# Patient Record
Sex: MALE | Race: WHITE | NOT HISPANIC OR LATINO
[De-identification: names, ages, dates, MRNs, and addresses within clinical notes are randomized per-mention and may not be internally consistent; named-entity substitution may affect disease eponyms.]

---

## 2017-01-11 ENCOUNTER — APPOINTMENT (OUTPATIENT)
Dept: ENDOCRINOLOGY | Facility: CLINIC | Age: 47
End: 2017-01-11

## 2017-01-11 VITALS
HEART RATE: 109 BPM | BODY MASS INDEX: 46.29 KG/M2 | OXYGEN SATURATION: 98 % | SYSTOLIC BLOOD PRESSURE: 102 MMHG | DIASTOLIC BLOOD PRESSURE: 70 MMHG | WEIGHT: 258 LBS | HEIGHT: 62.5 IN

## 2017-01-11 LAB
GLUCOSE BLDC GLUCOMTR-MCNC: 240
HBA1C MFR BLD HPLC: 7.3

## 2017-01-25 ENCOUNTER — RX RENEWAL (OUTPATIENT)
Age: 47
End: 2017-01-25

## 2017-01-30 ENCOUNTER — APPOINTMENT (OUTPATIENT)
Dept: ENDOCRINOLOGY | Facility: CLINIC | Age: 47
End: 2017-01-30

## 2017-02-21 ENCOUNTER — MEDICATION RENEWAL (OUTPATIENT)
Age: 47
End: 2017-02-21

## 2017-02-21 RX ORDER — BLOOD-GLUCOSE METER
W/DEVICE KIT MISCELLANEOUS
Qty: 1 | Refills: 0 | Status: ACTIVE | COMMUNITY
Start: 2017-02-21 | End: 1900-01-01

## 2017-02-21 RX ORDER — LANCETS 33 GAUGE
EACH MISCELLANEOUS
Qty: 2 | Refills: 0 | Status: ACTIVE | COMMUNITY
Start: 2017-02-21 | End: 1900-01-01

## 2017-02-21 RX ORDER — BLOOD SUGAR DIAGNOSTIC
STRIP MISCELLANEOUS
Qty: 2 | Refills: 0 | Status: ACTIVE | COMMUNITY
Start: 2017-02-21 | End: 1900-01-01

## 2017-04-18 ENCOUNTER — OTHER (OUTPATIENT)
Age: 47
End: 2017-04-18

## 2017-05-09 ENCOUNTER — RX RENEWAL (OUTPATIENT)
Age: 47
End: 2017-05-09

## 2017-05-14 ENCOUNTER — RX RENEWAL (OUTPATIENT)
Age: 47
End: 2017-05-14

## 2017-05-15 ENCOUNTER — RX RENEWAL (OUTPATIENT)
Age: 47
End: 2017-05-15

## 2017-05-25 ENCOUNTER — APPOINTMENT (OUTPATIENT)
Dept: CARDIOLOGY | Facility: CLINIC | Age: 47
End: 2017-05-25

## 2017-06-06 ENCOUNTER — APPOINTMENT (OUTPATIENT)
Dept: ENDOCRINOLOGY | Facility: CLINIC | Age: 47
End: 2017-06-06

## 2017-06-06 VITALS
HEIGHT: 74.5 IN | SYSTOLIC BLOOD PRESSURE: 120 MMHG | WEIGHT: 259 LBS | BODY MASS INDEX: 32.89 KG/M2 | HEART RATE: 99 BPM | DIASTOLIC BLOOD PRESSURE: 80 MMHG | OXYGEN SATURATION: 98 %

## 2017-06-06 LAB
GLUCOSE BLDC GLUCOMTR-MCNC: 170
HBA1C MFR BLD HPLC: 8.6

## 2017-06-06 RX ORDER — ALBUTEROL SULFATE 90 UG/1
108 (90 BASE) AEROSOL, METERED RESPIRATORY (INHALATION)
Qty: 8 | Refills: 0 | Status: ACTIVE | COMMUNITY
Start: 2017-01-03

## 2017-06-13 ENCOUNTER — MEDICATION RENEWAL (OUTPATIENT)
Age: 47
End: 2017-06-13

## 2017-06-19 ENCOUNTER — RX RENEWAL (OUTPATIENT)
Age: 47
End: 2017-06-19

## 2017-10-06 ENCOUNTER — MEDICATION RENEWAL (OUTPATIENT)
Age: 47
End: 2017-10-06

## 2017-10-16 ENCOUNTER — RX RENEWAL (OUTPATIENT)
Age: 47
End: 2017-10-16

## 2017-10-31 ENCOUNTER — APPOINTMENT (OUTPATIENT)
Dept: ENDOCRINOLOGY | Facility: CLINIC | Age: 47
End: 2017-10-31
Payer: COMMERCIAL

## 2017-10-31 LAB
GLUCOSE BLDC GLUCOMTR-MCNC: 255
HBA1C MFR BLD HPLC: 10.5

## 2017-10-31 PROCEDURE — 83036 HEMOGLOBIN GLYCOSYLATED A1C: CPT | Mod: QW

## 2017-10-31 PROCEDURE — G0008: CPT

## 2017-10-31 PROCEDURE — 82962 GLUCOSE BLOOD TEST: CPT

## 2017-10-31 PROCEDURE — 99214 OFFICE O/P EST MOD 30 MIN: CPT | Mod: 25

## 2017-10-31 PROCEDURE — 90686 IIV4 VACC NO PRSV 0.5 ML IM: CPT

## 2017-11-02 LAB
ALBUMIN SERPL ELPH-MCNC: 4.6 G/DL
ALP BLD-CCNC: 67 U/L
ALT SERPL-CCNC: 35 U/L
ANION GAP SERPL CALC-SCNC: 22 MMOL/L
AST SERPL-CCNC: 33 U/L
BILIRUB SERPL-MCNC: 1.1 MG/DL
BUN SERPL-MCNC: 16 MG/DL
CALCIUM SERPL-MCNC: 10 MG/DL
CHLORIDE SERPL-SCNC: 100 MMOL/L
CHOLEST SERPL-MCNC: 149 MG/DL
CHOLEST/HDLC SERPL: 6 RATIO
CO2 SERPL-SCNC: 20 MMOL/L
CREAT SERPL-MCNC: 0.95 MG/DL
CREAT SPEC-SCNC: 93 MG/DL
GLUCOSE SERPL-MCNC: 271 MG/DL
HDLC SERPL-MCNC: 25 MG/DL
LDLC SERPL CALC-MCNC: 53 MG/DL
MICROALBUMIN 24H UR DL<=1MG/L-MCNC: 2 MG/DL
MICROALBUMIN/CREAT 24H UR-RTO: 22 MG/G
POTASSIUM SERPL-SCNC: 4.7 MMOL/L
PROT SERPL-MCNC: 7.9 G/DL
SODIUM SERPL-SCNC: 142 MMOL/L
TRIGL SERPL-MCNC: 355 MG/DL

## 2017-12-05 ENCOUNTER — APPOINTMENT (OUTPATIENT)
Dept: ENDOCRINOLOGY | Facility: CLINIC | Age: 47
End: 2017-12-05

## 2018-01-10 ENCOUNTER — RX RENEWAL (OUTPATIENT)
Age: 48
End: 2018-01-10

## 2018-01-24 ENCOUNTER — APPOINTMENT (OUTPATIENT)
Dept: UROLOGY | Facility: CLINIC | Age: 48
End: 2018-01-24
Payer: COMMERCIAL

## 2018-01-24 PROCEDURE — 99204 OFFICE O/P NEW MOD 45 MIN: CPT

## 2018-02-05 ENCOUNTER — RX RENEWAL (OUTPATIENT)
Age: 48
End: 2018-02-05

## 2018-03-02 ENCOUNTER — APPOINTMENT (OUTPATIENT)
Dept: UROLOGY | Facility: CLINIC | Age: 48
End: 2018-03-02

## 2018-04-10 ENCOUNTER — RX RENEWAL (OUTPATIENT)
Age: 48
End: 2018-04-10

## 2018-05-13 ENCOUNTER — RX RENEWAL (OUTPATIENT)
Age: 48
End: 2018-05-13

## 2018-05-29 ENCOUNTER — RX RENEWAL (OUTPATIENT)
Age: 48
End: 2018-05-29

## 2018-05-30 ENCOUNTER — RX RENEWAL (OUTPATIENT)
Age: 48
End: 2018-05-30

## 2018-06-05 ENCOUNTER — RX RENEWAL (OUTPATIENT)
Age: 48
End: 2018-06-05

## 2018-06-06 ENCOUNTER — RX RENEWAL (OUTPATIENT)
Age: 48
End: 2018-06-06

## 2018-06-21 ENCOUNTER — APPOINTMENT (OUTPATIENT)
Dept: ENDOCRINOLOGY | Facility: CLINIC | Age: 48
End: 2018-06-21

## 2018-07-27 ENCOUNTER — RX RENEWAL (OUTPATIENT)
Age: 48
End: 2018-07-27

## 2019-01-10 ENCOUNTER — RX RENEWAL (OUTPATIENT)
Age: 49
End: 2019-01-10

## 2019-01-11 ENCOUNTER — RX RENEWAL (OUTPATIENT)
Age: 49
End: 2019-01-11

## 2019-01-21 ENCOUNTER — RX RENEWAL (OUTPATIENT)
Age: 49
End: 2019-01-21

## 2019-03-12 ENCOUNTER — INPATIENT (INPATIENT)
Facility: HOSPITAL | Age: 49
LOS: 1 days | Discharge: HOME | DRG: 872 | End: 2019-03-14
Attending: UROLOGY | Admitting: UROLOGY
Payer: COMMERCIAL

## 2019-03-12 VITALS
HEIGHT: 74 IN | HEART RATE: 16 BPM | TEMPERATURE: 99 F | OXYGEN SATURATION: 100 % | WEIGHT: 250 LBS | SYSTOLIC BLOOD PRESSURE: 114 MMHG | RESPIRATION RATE: 16 BRPM | DIASTOLIC BLOOD PRESSURE: 82 MMHG

## 2019-03-12 LAB
ALBUMIN SERPL ELPH-MCNC: 4.4 G/DL — SIGNIFICANT CHANGE UP (ref 3.3–5)
ALP SERPL-CCNC: 56 U/L — SIGNIFICANT CHANGE UP (ref 40–120)
ALT FLD-CCNC: 30 U/L — SIGNIFICANT CHANGE UP (ref 10–45)
ANION GAP SERPL CALC-SCNC: 16 MMOL/L — SIGNIFICANT CHANGE UP (ref 5–17)
AST SERPL-CCNC: 57 U/L — HIGH (ref 10–40)
BASE EXCESS BLDV CALC-SCNC: 2.4 MMOL/L — HIGH (ref -2–2)
BASOPHILS # BLD AUTO: 0 K/UL — SIGNIFICANT CHANGE UP (ref 0–0.2)
BASOPHILS NFR BLD AUTO: 0.3 % — SIGNIFICANT CHANGE UP (ref 0–2)
BILIRUB SERPL-MCNC: 1 MG/DL — SIGNIFICANT CHANGE UP (ref 0.2–1.2)
BUN SERPL-MCNC: 16 MG/DL — SIGNIFICANT CHANGE UP (ref 7–23)
CA-I SERPL-SCNC: 1.14 MMOL/L — SIGNIFICANT CHANGE UP (ref 1.12–1.3)
CALCIUM SERPL-MCNC: 9.7 MG/DL — SIGNIFICANT CHANGE UP (ref 8.4–10.5)
CHLORIDE BLDV-SCNC: 105 MMOL/L — SIGNIFICANT CHANGE UP (ref 96–108)
CHLORIDE SERPL-SCNC: 100 MMOL/L — SIGNIFICANT CHANGE UP (ref 96–108)
CO2 BLDV-SCNC: 28 MMOL/L — SIGNIFICANT CHANGE UP (ref 22–30)
CO2 SERPL-SCNC: 21 MMOL/L — LOW (ref 22–31)
CREAT SERPL-MCNC: 1.05 MG/DL — SIGNIFICANT CHANGE UP (ref 0.5–1.3)
EOSINOPHIL # BLD AUTO: 0.3 K/UL — SIGNIFICANT CHANGE UP (ref 0–0.5)
EOSINOPHIL NFR BLD AUTO: 3.5 % — SIGNIFICANT CHANGE UP (ref 0–6)
GAS PNL BLDV: 135 MMOL/L — LOW (ref 136–145)
GAS PNL BLDV: SIGNIFICANT CHANGE UP
GLUCOSE BLDV-MCNC: 154 MG/DL — HIGH (ref 70–99)
GLUCOSE SERPL-MCNC: 158 MG/DL — HIGH (ref 70–99)
HCO3 BLDV-SCNC: 27 MMOL/L — SIGNIFICANT CHANGE UP (ref 21–29)
HCT VFR BLD CALC: 41.6 % — SIGNIFICANT CHANGE UP (ref 39–50)
HCT VFR BLDA CALC: 39 % — SIGNIFICANT CHANGE UP (ref 39–50)
HGB BLD CALC-MCNC: 12.7 G/DL — LOW (ref 13–17)
HGB BLD-MCNC: 15 G/DL — SIGNIFICANT CHANGE UP (ref 13–17)
LACTATE BLDV-MCNC: 1.8 MMOL/L — SIGNIFICANT CHANGE UP (ref 0.7–2)
LYMPHOCYTES # BLD AUTO: 1.8 K/UL — SIGNIFICANT CHANGE UP (ref 1–3.3)
LYMPHOCYTES # BLD AUTO: 20.9 % — SIGNIFICANT CHANGE UP (ref 13–44)
MCHC RBC-ENTMCNC: 34 PG — SIGNIFICANT CHANGE UP (ref 27–34)
MCHC RBC-ENTMCNC: 36.1 GM/DL — HIGH (ref 32–36)
MCV RBC AUTO: 94.1 FL — SIGNIFICANT CHANGE UP (ref 80–100)
MONOCYTES # BLD AUTO: 0.5 K/UL — SIGNIFICANT CHANGE UP (ref 0–0.9)
MONOCYTES NFR BLD AUTO: 5.8 % — SIGNIFICANT CHANGE UP (ref 2–14)
NEUTROPHILS # BLD AUTO: 6 K/UL — SIGNIFICANT CHANGE UP (ref 1.8–7.4)
NEUTROPHILS NFR BLD AUTO: 69.4 % — SIGNIFICANT CHANGE UP (ref 43–77)
OTHER CELLS CSF MANUAL: 11 ML/DL — LOW (ref 18–22)
PCO2 BLDV: 42 MMHG — SIGNIFICANT CHANGE UP (ref 35–50)
PH BLDV: 7.42 — SIGNIFICANT CHANGE UP (ref 7.35–7.45)
PLATELET # BLD AUTO: 110 K/UL — LOW (ref 150–400)
PO2 BLDV: 33 MMHG — SIGNIFICANT CHANGE UP (ref 25–45)
POTASSIUM BLDV-SCNC: 4.2 MMOL/L — SIGNIFICANT CHANGE UP (ref 3.5–5.3)
POTASSIUM SERPL-MCNC: 4.5 MMOL/L — SIGNIFICANT CHANGE UP (ref 3.5–5.3)
POTASSIUM SERPL-SCNC: 4.5 MMOL/L — SIGNIFICANT CHANGE UP (ref 3.5–5.3)
PROT SERPL-MCNC: 8.1 G/DL — SIGNIFICANT CHANGE UP (ref 6–8.3)
RBC # BLD: 4.42 M/UL — SIGNIFICANT CHANGE UP (ref 4.2–5.8)
RBC # FLD: 12.2 % — SIGNIFICANT CHANGE UP (ref 10.3–14.5)
SAO2 % BLDV: 61 % — LOW (ref 67–88)
SODIUM SERPL-SCNC: 137 MMOL/L — SIGNIFICANT CHANGE UP (ref 135–145)
WBC # BLD: 8.6 K/UL — SIGNIFICANT CHANGE UP (ref 3.8–10.5)
WBC # FLD AUTO: 8.6 K/UL — SIGNIFICANT CHANGE UP (ref 3.8–10.5)

## 2019-03-12 PROCEDURE — 74176 CT ABD & PELVIS W/O CONTRAST: CPT | Mod: 26

## 2019-03-12 PROCEDURE — 99285 EMERGENCY DEPT VISIT HI MDM: CPT | Mod: 25

## 2019-03-12 PROCEDURE — 93010 ELECTROCARDIOGRAM REPORT: CPT

## 2019-03-12 RX ORDER — ONDANSETRON 8 MG/1
4 TABLET, FILM COATED ORAL ONCE
Qty: 0 | Refills: 0 | Status: COMPLETED | OUTPATIENT
Start: 2019-03-12 | End: 2019-03-12

## 2019-03-12 RX ORDER — KETOROLAC TROMETHAMINE 30 MG/ML
15 SYRINGE (ML) INJECTION ONCE
Qty: 0 | Refills: 0 | Status: DISCONTINUED | OUTPATIENT
Start: 2019-03-12 | End: 2019-03-12

## 2019-03-12 RX ORDER — CEFTRIAXONE 500 MG/1
1 INJECTION, POWDER, FOR SOLUTION INTRAMUSCULAR; INTRAVENOUS ONCE
Qty: 0 | Refills: 0 | Status: COMPLETED | OUTPATIENT
Start: 2019-03-12 | End: 2019-03-12

## 2019-03-12 RX ORDER — ACETAMINOPHEN 500 MG
1000 TABLET ORAL ONCE
Qty: 0 | Refills: 0 | Status: COMPLETED | OUTPATIENT
Start: 2019-03-12 | End: 2019-03-12

## 2019-03-12 RX ORDER — SODIUM CHLORIDE 9 MG/ML
3500 INJECTION, SOLUTION INTRAVENOUS ONCE
Qty: 0 | Refills: 0 | Status: COMPLETED | OUTPATIENT
Start: 2019-03-12 | End: 2019-03-12

## 2019-03-12 RX ADMIN — Medication 15 MILLIGRAM(S): at 22:47

## 2019-03-12 RX ADMIN — SODIUM CHLORIDE 3500 MILLILITER(S): 9 INJECTION, SOLUTION INTRAVENOUS at 22:48

## 2019-03-12 RX ADMIN — ONDANSETRON 4 MILLIGRAM(S): 8 TABLET, FILM COATED ORAL at 23:14

## 2019-03-12 RX ADMIN — CEFTRIAXONE 100 GRAM(S): 500 INJECTION, POWDER, FOR SOLUTION INTRAMUSCULAR; INTRAVENOUS at 22:48

## 2019-03-12 RX ADMIN — Medication 400 MILLIGRAM(S): at 22:46

## 2019-03-12 NOTE — ED PROVIDER NOTE - PROGRESS NOTE DETAILS
Attending note (Emile): Patient now brought back from waiting room to room 34; repeat vitals upon arrival identified fever and tachycardia to low 100s; concern now for sepsis; code sepsis called, iv access established and empiric iv fluid bolus and antibiotics. Urology consulted. pt currently pain controlled. Attending note (Emile): Pt found to have 10mm stone in the R renal pelvis; given concern for sepsis on presentation, now concern for infected renal stone; allready have given ceftriaxone.  Urology consulted, to evaluate patient; awaiting their recommendations.

## 2019-03-12 NOTE — ED PROVIDER NOTE - OBJECTIVE STATEMENT
48 YOM pmh DM on insulin, kidney stones p/w right flank pain radiating to right groin x 3 days similar to previous kidney stones, no previous interventions for stones. Pt states he started having nausea and vomiting today and was unable to keep any of his pain medications down. Pt also was febrile in triage. No dysuria, no frequency. Pt denies chest pain. No weakness.

## 2019-03-12 NOTE — ED PROVIDER NOTE - CLINICAL SUMMARY MEDICAL DECISION MAKING FREE TEXT BOX
Fever, nausea, vomiting with Right flank pain radiating to Right groin with mild cvat. Concern for septic stone. Will treat with antibiotics, pain control, hydrate, consult urology. Likely source of fever is from kidney stone. Given comorbidities will likely need admission. Will also workup for DKA given abd pain and nausea and vomiting and decreased po intake.

## 2019-03-12 NOTE — ED ADULT NURSE NOTE - OBJECTIVE STATEMENT
49 yo M pmh of renal calculi and DM1 came to ED c/o rt flank pain radiation RLQ and rt groin starting today.  Pt states it feels like renal calculi.  Denies CP, SOB, diarrhea, lightheadedness, dizziness, changes in urinary or bowel habits.  A&Ox4, +CVA tenderness on right side.  able to ambulate without difficulty.  VSS.  Safety and comfort maintained.  Will continue to monitor.

## 2019-03-12 NOTE — ED PROVIDER NOTE - NS ED ROS FT
CONSTITUTIONAL: + fevers, no chills  Eyes: no visual changes  Ears: no ear drainage, no ear pain  Nose: no nasal congestion  Mouth/Throat: no sore throat  Cardiovascular: No Chest pain  Respiratory: No SOB  Gastrointestinal: +n/v, + abd pain  Genitourinary: no dysuria, no hematuria  SKIN: no rashes.  NEURO: no headache

## 2019-03-12 NOTE — ED PROVIDER NOTE - SHIFT CHANGE DETAILS
I have endorsed this patient to the incoming physician, including clinical history, physical exam, current results and assessment/plan. Pending Urology consultation and admission. -Emile

## 2019-03-12 NOTE — ED PROVIDER NOTE - ATTENDING CONTRIBUTION TO CARE
49y/o M with h/o DM-1 and renal stones, presenting with R flank pain radiating to groin; found to be febrile, tachycardic; sepsis suspected (likely urinary source based on presentation); started on empiric antibiotics and given 30cc / kg iv fluid bolus, labs including blood and urine cultures sent and CT AP ordered (renal stone hunt protocol).    On exam, appears in NAD, speaking in clear sentences, A&Ox3. Febrile.  Cardiac s1s2, Lungs CTABL, Abd soft; +R CVA tenderness; no significant abdominal tenderness, no rebound/guarding, no overlying skin changes;  exam shows normal nontender testes, no penile discharge, no palpable inguinal hernias.    Concern for urosepsis, possibly c/b renal stone; less likely other intraabdominal pathology (no RLQ tenderness to suggest appendicitis).      ED Course:  CT obtained, revealed 10mm stone in the R renal pelvis appearing to obstruct; and R perinephric stranding; concerning for pyelonephritis c/b stone; have consulted urology and will plan for admission for further management.

## 2019-03-12 NOTE — ED ADULT NURSE NOTE - NSIMPLEMENTINTERV_GEN_ALL_ED
Implemented All Universal Safety Interventions:  Helm to call system. Call bell, personal items and telephone within reach. Instruct patient to call for assistance. Room bathroom lighting operational. Non-slip footwear when patient is off stretcher. Physically safe environment: no spills, clutter or unnecessary equipment. Stretcher in lowest position, wheels locked, appropriate side rails in place.

## 2019-03-13 DIAGNOSIS — A41.9 SEPSIS, UNSPECIFIED ORGANISM: ICD-10-CM

## 2019-03-13 LAB
ANION GAP SERPL CALC-SCNC: 11 MMOL/L — SIGNIFICANT CHANGE UP (ref 5–17)
APPEARANCE UR: CLEAR — SIGNIFICANT CHANGE UP
APTT BLD: 28.1 SEC — SIGNIFICANT CHANGE UP (ref 27.5–36.3)
BILIRUB UR-MCNC: NEGATIVE — SIGNIFICANT CHANGE UP
BUN SERPL-MCNC: 13 MG/DL — SIGNIFICANT CHANGE UP (ref 7–23)
CALCIUM SERPL-MCNC: 9.3 MG/DL — SIGNIFICANT CHANGE UP (ref 8.4–10.5)
CHLORIDE SERPL-SCNC: 101 MMOL/L — SIGNIFICANT CHANGE UP (ref 96–108)
CO2 SERPL-SCNC: 27 MMOL/L — SIGNIFICANT CHANGE UP (ref 22–31)
COLOR SPEC: YELLOW — SIGNIFICANT CHANGE UP
CREAT SERPL-MCNC: 1.06 MG/DL — SIGNIFICANT CHANGE UP (ref 0.5–1.3)
DIFF PNL FLD: NEGATIVE — SIGNIFICANT CHANGE UP
GLUCOSE BLDC GLUCOMTR-MCNC: 113 MG/DL — HIGH (ref 70–99)
GLUCOSE BLDC GLUCOMTR-MCNC: 118 MG/DL — HIGH (ref 70–99)
GLUCOSE BLDC GLUCOMTR-MCNC: 133 MG/DL — HIGH (ref 70–99)
GLUCOSE BLDC GLUCOMTR-MCNC: 135 MG/DL — HIGH (ref 70–99)
GLUCOSE SERPL-MCNC: 149 MG/DL — HIGH (ref 70–99)
GLUCOSE UR QL: ABNORMAL
HBA1C BLD-MCNC: 5.3 % — SIGNIFICANT CHANGE UP (ref 4–5.6)
HCT VFR BLD CALC: 36.4 % — LOW (ref 39–50)
HGB BLD-MCNC: 13.5 G/DL — SIGNIFICANT CHANGE UP (ref 13–17)
INR BLD: 1.28 RATIO — HIGH (ref 0.88–1.16)
KETONES UR-MCNC: ABNORMAL
LEUKOCYTE ESTERASE UR-ACNC: NEGATIVE — SIGNIFICANT CHANGE UP
MCHC RBC-ENTMCNC: 35.1 PG — HIGH (ref 27–34)
MCHC RBC-ENTMCNC: 37 GM/DL — HIGH (ref 32–36)
MCV RBC AUTO: 94.7 FL — SIGNIFICANT CHANGE UP (ref 80–100)
NITRITE UR-MCNC: NEGATIVE — SIGNIFICANT CHANGE UP
PH UR: 6.5 — SIGNIFICANT CHANGE UP (ref 5–8)
PLATELET # BLD AUTO: 98 K/UL — LOW (ref 150–400)
POTASSIUM SERPL-MCNC: 4.3 MMOL/L — SIGNIFICANT CHANGE UP (ref 3.5–5.3)
POTASSIUM SERPL-SCNC: 4.3 MMOL/L — SIGNIFICANT CHANGE UP (ref 3.5–5.3)
PROT UR-MCNC: ABNORMAL
PROTHROM AB SERPL-ACNC: 14.8 SEC — HIGH (ref 10–12.9)
RBC # BLD: 3.84 M/UL — LOW (ref 4.2–5.8)
RBC # FLD: 12.1 % — SIGNIFICANT CHANGE UP (ref 10.3–14.5)
SODIUM SERPL-SCNC: 139 MMOL/L — SIGNIFICANT CHANGE UP (ref 135–145)
SP GR SPEC: 1.03 — HIGH (ref 1.01–1.02)
UROBILINOGEN FLD QL: ABNORMAL
WBC # BLD: 7.7 K/UL — SIGNIFICANT CHANGE UP (ref 3.8–10.5)
WBC # FLD AUTO: 7.7 K/UL — SIGNIFICANT CHANGE UP (ref 3.8–10.5)

## 2019-03-13 PROCEDURE — 99252 IP/OBS CONSLTJ NEW/EST SF 35: CPT

## 2019-03-13 RX ORDER — OXYCODONE AND ACETAMINOPHEN 5; 325 MG/1; MG/1
2 TABLET ORAL EVERY 6 HOURS
Qty: 0 | Refills: 0 | Status: DISCONTINUED | OUTPATIENT
Start: 2019-03-13 | End: 2019-03-14

## 2019-03-13 RX ORDER — INSULIN LISPRO 100/ML
15 VIAL (ML) SUBCUTANEOUS
Qty: 0 | Refills: 0 | Status: DISCONTINUED | OUTPATIENT
Start: 2019-03-13 | End: 2019-03-14

## 2019-03-13 RX ORDER — HYDROMORPHONE HYDROCHLORIDE 2 MG/ML
1 INJECTION INTRAMUSCULAR; INTRAVENOUS; SUBCUTANEOUS EVERY 4 HOURS
Qty: 0 | Refills: 0 | Status: DISCONTINUED | OUTPATIENT
Start: 2019-03-13 | End: 2019-03-14

## 2019-03-13 RX ORDER — ASPIRIN/CALCIUM CARB/MAGNESIUM 324 MG
81 TABLET ORAL DAILY
Qty: 0 | Refills: 0 | Status: DISCONTINUED | OUTPATIENT
Start: 2019-03-13 | End: 2019-03-14

## 2019-03-13 RX ORDER — SODIUM CHLORIDE 9 MG/ML
1000 INJECTION, SOLUTION INTRAVENOUS
Qty: 0 | Refills: 0 | Status: DISCONTINUED | OUTPATIENT
Start: 2019-03-13 | End: 2019-03-14

## 2019-03-13 RX ORDER — DEXTROSE 50 % IN WATER 50 %
12.5 SYRINGE (ML) INTRAVENOUS ONCE
Qty: 0 | Refills: 0 | Status: DISCONTINUED | OUTPATIENT
Start: 2019-03-13 | End: 2019-03-14

## 2019-03-13 RX ORDER — ATORVASTATIN CALCIUM 80 MG/1
20 TABLET, FILM COATED ORAL AT BEDTIME
Qty: 0 | Refills: 0 | Status: DISCONTINUED | OUTPATIENT
Start: 2019-03-13 | End: 2019-03-14

## 2019-03-13 RX ORDER — GLUCAGON INJECTION, SOLUTION 0.5 MG/.1ML
1 INJECTION, SOLUTION SUBCUTANEOUS ONCE
Qty: 0 | Refills: 0 | Status: DISCONTINUED | OUTPATIENT
Start: 2019-03-13 | End: 2019-03-14

## 2019-03-13 RX ORDER — INSULIN GLARGINE 100 [IU]/ML
30 INJECTION, SOLUTION SUBCUTANEOUS AT BEDTIME
Qty: 0 | Refills: 0 | Status: DISCONTINUED | OUTPATIENT
Start: 2019-03-13 | End: 2019-03-14

## 2019-03-13 RX ORDER — ONDANSETRON 8 MG/1
4 TABLET, FILM COATED ORAL EVERY 6 HOURS
Qty: 0 | Refills: 0 | Status: DISCONTINUED | OUTPATIENT
Start: 2019-03-13 | End: 2019-03-14

## 2019-03-13 RX ORDER — DEXTROSE 50 % IN WATER 50 %
15 SYRINGE (ML) INTRAVENOUS ONCE
Qty: 0 | Refills: 0 | Status: DISCONTINUED | OUTPATIENT
Start: 2019-03-13 | End: 2019-03-14

## 2019-03-13 RX ORDER — DEXTROSE 50 % IN WATER 50 %
25 SYRINGE (ML) INTRAVENOUS ONCE
Qty: 0 | Refills: 0 | Status: DISCONTINUED | OUTPATIENT
Start: 2019-03-13 | End: 2019-03-14

## 2019-03-13 RX ORDER — CEFTRIAXONE 500 MG/1
1 INJECTION, POWDER, FOR SOLUTION INTRAMUSCULAR; INTRAVENOUS EVERY 24 HOURS
Qty: 0 | Refills: 0 | Status: DISCONTINUED | OUTPATIENT
Start: 2019-03-13 | End: 2019-03-14

## 2019-03-13 RX ORDER — METFORMIN HYDROCHLORIDE 850 MG/1
1000 TABLET ORAL
Qty: 0 | Refills: 0 | Status: DISCONTINUED | OUTPATIENT
Start: 2019-03-13 | End: 2019-03-13

## 2019-03-13 RX ORDER — SENNA PLUS 8.6 MG/1
2 TABLET ORAL AT BEDTIME
Qty: 0 | Refills: 0 | Status: DISCONTINUED | OUTPATIENT
Start: 2019-03-13 | End: 2019-03-14

## 2019-03-13 RX ORDER — KETOROLAC TROMETHAMINE 30 MG/ML
15 SYRINGE (ML) INJECTION ONCE
Qty: 0 | Refills: 0 | Status: DISCONTINUED | OUTPATIENT
Start: 2019-03-13 | End: 2019-03-13

## 2019-03-13 RX ORDER — INSULIN GLARGINE 100 [IU]/ML
60 INJECTION, SOLUTION SUBCUTANEOUS AT BEDTIME
Qty: 0 | Refills: 0 | Status: DISCONTINUED | OUTPATIENT
Start: 2019-03-13 | End: 2019-03-13

## 2019-03-13 RX ORDER — ACETAMINOPHEN 500 MG
650 TABLET ORAL EVERY 6 HOURS
Qty: 0 | Refills: 0 | Status: DISCONTINUED | OUTPATIENT
Start: 2019-03-13 | End: 2019-03-14

## 2019-03-13 RX ORDER — HEPARIN SODIUM 5000 [USP'U]/ML
5000 INJECTION INTRAVENOUS; SUBCUTANEOUS EVERY 12 HOURS
Qty: 0 | Refills: 0 | Status: DISCONTINUED | OUTPATIENT
Start: 2019-03-13 | End: 2019-03-14

## 2019-03-13 RX ORDER — LOSARTAN POTASSIUM 100 MG/1
50 TABLET, FILM COATED ORAL DAILY
Qty: 0 | Refills: 0 | Status: DISCONTINUED | OUTPATIENT
Start: 2019-03-13 | End: 2019-03-14

## 2019-03-13 RX ADMIN — INSULIN GLARGINE 30 UNIT(S): 100 INJECTION, SOLUTION SUBCUTANEOUS at 22:19

## 2019-03-13 RX ADMIN — Medication 15 MILLIGRAM(S): at 09:55

## 2019-03-13 RX ADMIN — HEPARIN SODIUM 5000 UNIT(S): 5000 INJECTION INTRAVENOUS; SUBCUTANEOUS at 06:06

## 2019-03-13 RX ADMIN — Medication 81 MILLIGRAM(S): at 11:35

## 2019-03-13 RX ADMIN — HEPARIN SODIUM 5000 UNIT(S): 5000 INJECTION INTRAVENOUS; SUBCUTANEOUS at 17:55

## 2019-03-13 RX ADMIN — Medication 15 UNIT(S): at 18:56

## 2019-03-13 RX ADMIN — LOSARTAN POTASSIUM 50 MILLIGRAM(S): 100 TABLET, FILM COATED ORAL at 11:35

## 2019-03-13 RX ADMIN — ATORVASTATIN CALCIUM 20 MILLIGRAM(S): 80 TABLET, FILM COATED ORAL at 22:19

## 2019-03-13 RX ADMIN — CEFTRIAXONE 100 GRAM(S): 500 INJECTION, POWDER, FOR SOLUTION INTRAMUSCULAR; INTRAVENOUS at 22:20

## 2019-03-13 RX ADMIN — Medication 15 MILLIGRAM(S): at 10:15

## 2019-03-13 RX ADMIN — OXYCODONE AND ACETAMINOPHEN 2 TABLET(S): 5; 325 TABLET ORAL at 02:15

## 2019-03-13 RX ADMIN — OXYCODONE AND ACETAMINOPHEN 2 TABLET(S): 5; 325 TABLET ORAL at 13:47

## 2019-03-13 NOTE — H&P ADULT - HISTORY OF PRESENT ILLNESS
pt 49y/o M. PMHx  T1DM, multiple times Kidney stone w/o intervention. come to ED c/o RLQ pain x -3day duration. The pain was sharp, 10/10, intermittent, and radiates to the R flank and R groin. Had  nausea and vomited 3 times today. Denies fever and chills in the past 3days, but F: 101 at ED. Denies hematuria, dysuria nor any other urinary symptoms. Patient had CT abdominal found to have a 10mm stone in the R kidney ,  Followed up with urologist Dr. Hoenig . Multiple similar pain attacked before.

## 2019-03-13 NOTE — CONSULT NOTE ADULT - ASSESSMENT
pt 47y/o M. PMHx  T1DM, multiple times Kidney stone w/o intervention. come to ED c/o RLQ pain x -3day duration. T:101 at ED, CT show 10mm stone in R renal pelvis. Normal WBC, Cr level    Plan  - admitted to urology  - cont. Abx  - NPO over night/IVF   - DVT ppx  - pain control  - monitor VS  - strain urine  - Am lab   - reevaluation at AM possible OR

## 2019-03-13 NOTE — H&P ADULT - NSHPPHYSICALEXAM_GEN_ALL_CORE
Gen: NAD  Pulm: No respiratory distress, no subcostal retractions  CV: RRR, no JVD  Abd: Soft, mild tenderness on the RLQ, ND  Back: No CVAT B/L  : Circumcised, no lesions.  No discharge or blood at urethral meatus.  Testes descended bilaterally.  Testes and epididymis nontender bilaterally.    MSK: No edema present

## 2019-03-13 NOTE — CONSULT NOTE ADULT - SUBJECTIVE AND OBJECTIVE BOX
pt 47y/o M. PMHx  T1DM, multiple times Kidney stone w/o intervention. come to ED c/o RLQ pain x -3day duration. The pain was sharp, 10/10, intermittent, and radiates to the R flank and R groin. Had  nausea and vomited 3 times today. Denies fever and chills in the past 3days, but F: 101 at ED. Denies hematuria, dysuria nor any other urinary symptoms. Patient had CT abdominal found to have a 10mm stone in the R kidney w/ w/o hydronephrosis,  Followed up with urologist   . Multiple similar pain attacked before.       PAST MEDICAL & SURGICAL HISTORY:  Diabetes  No significant past surgical history    FAMILY HISTORY:    SOCIAL HISTORY:   Tobacco hx:  MEDICATIONS  (STANDING):    MEDICATIONS  (PRN):    Allergies    No Known Allergies    Intolerances        REVIEW OF SYSTEMS: Pertinent positives and negatives as stated in HPI, otherwise negative    Vital signs  T(C): 38.3 (03-12-19 @ 22:14), Max: 38.3 (03-12-19 @ 22:14)  HR: 102 (03-12-19 @ 22:14)  BP: 117/76 (03-12-19 @ 22:14)  SpO2: 100% (03-12-19 @ 22:14)  Wt(kg): --    Output      Physical Exam  Gen: NAD  Pulm: No respiratory distress, no subcostal retractions  CV: RRR, no JVD  Abd: Soft, mild tenderness on the RLQ, ND  Back: No CVAT B/L  : Circumcised, no lesions.  No discharge or blood at urethral meatus.  Testes descended bilaterally.  Testes and epididymis nontender bilaterally.    MSK: No edema present    LABS:        03-12 @ 22:46    WBC 8.6   / Hct 41.6  / SCr 1.05     03-12    137  |  100  |  16  ----------------------------<  158<H>  4.5   |  21<L>  |  1.05    Ca    9.7      12 Mar 2019 22:46    TPro  8.1  /  Alb  4.4  /  TBili  1.0  /  DBili  x   /  AST  57<H>  /  ALT  30  /  AlkPhos  56  03-12          Urine Cx:   Blood Cx:    Radiology:  < from: CT Abdomen and Pelvis No Cont (03.12.19 @ 22:52) >  KIDNEYS/URETERS: 10 mm calculus within the right renal pelvis. Mild   distention of a upper pole calyx.. Nonobstructing left upper pole renal   calculus. No left-sided hydronephrosis. Mild bilateral relatively   symmetric perinephric stranding, nonspecific.    BLADDER: Within normal limits.    < end of copied text > pt 47y/o M. PMHx  T1DM, multiple times Kidney stone w/o intervention. come to ED c/o RLQ pain x -3day duration. The pain was sharp, 10/10, intermittent, and radiates to the R flank and R groin. Had  nausea and vomited 3 times today. Denies fever and chills in the past 3days, but F: 101 at ED. Denies hematuria, dysuria nor any other urinary symptoms. Patient had CT abdominal found to have a 10mm stone in the R kidney ,  Followed up with urologist Dr. Hoenig . Multiple similar pain attacked before.       PAST MEDICAL & SURGICAL HISTORY:  Diabetes  No significant past surgical history    FAMILY HISTORY:    SOCIAL HISTORY:   Tobacco hx:  MEDICATIONS  (STANDING):    MEDICATIONS  (PRN):    Allergies    No Known Allergies    Intolerances        REVIEW OF SYSTEMS: Pertinent positives and negatives as stated in HPI, otherwise negative    Vital signs  T(C): 38.3 (03-12-19 @ 22:14), Max: 38.3 (03-12-19 @ 22:14)  HR: 102 (03-12-19 @ 22:14)  BP: 117/76 (03-12-19 @ 22:14)  SpO2: 100% (03-12-19 @ 22:14)  Wt(kg): --    Output      Physical Exam  Gen: NAD  Pulm: No respiratory distress, no subcostal retractions  CV: RRR, no JVD  Abd: Soft, mild tenderness on the RLQ, ND  Back: No CVAT B/L  : Circumcised, no lesions.  No discharge or blood at urethral meatus.  Testes descended bilaterally.  Testes and epididymis nontender bilaterally.    MSK: No edema present    LABS:        03-12 @ 22:46    WBC 8.6   / Hct 41.6  / SCr 1.05     03-12    137  |  100  |  16  ----------------------------<  158<H>  4.5   |  21<L>  |  1.05    Ca    9.7      12 Mar 2019 22:46    TPro  8.1  /  Alb  4.4  /  TBili  1.0  /  DBili  x   /  AST  57<H>  /  ALT  30  /  AlkPhos  56  03-12          Urine Cx:   Blood Cx:    Radiology:  < from: CT Abdomen and Pelvis No Cont (03.12.19 @ 22:52) >  KIDNEYS/URETERS: 10 mm calculus within the right renal pelvis. Mild   distention of a upper pole calyx.. Nonobstructing left upper pole renal   calculus. No left-sided hydronephrosis. Mild bilateral relatively   symmetric perinephric stranding, nonspecific.    BLADDER: Within normal limits.    < end of copied text >

## 2019-03-14 ENCOUNTER — TRANSCRIPTION ENCOUNTER (OUTPATIENT)
Age: 49
End: 2019-03-14

## 2019-03-14 VITALS
HEART RATE: 85 BPM | OXYGEN SATURATION: 98 % | RESPIRATION RATE: 18 BRPM | DIASTOLIC BLOOD PRESSURE: 72 MMHG | SYSTOLIC BLOOD PRESSURE: 120 MMHG | TEMPERATURE: 98 F

## 2019-03-14 LAB
ANION GAP SERPL CALC-SCNC: 11 MMOL/L — SIGNIFICANT CHANGE UP (ref 5–17)
BASOPHILS # BLD AUTO: 0 K/UL — SIGNIFICANT CHANGE UP (ref 0–0.2)
BASOPHILS NFR BLD AUTO: 0.7 % — SIGNIFICANT CHANGE UP (ref 0–2)
BUN SERPL-MCNC: 10 MG/DL — SIGNIFICANT CHANGE UP (ref 7–23)
CALCIUM SERPL-MCNC: 9.3 MG/DL — SIGNIFICANT CHANGE UP (ref 8.4–10.5)
CHLORIDE SERPL-SCNC: 104 MMOL/L — SIGNIFICANT CHANGE UP (ref 96–108)
CO2 SERPL-SCNC: 28 MMOL/L — SIGNIFICANT CHANGE UP (ref 22–31)
CREAT SERPL-MCNC: 1.04 MG/DL — SIGNIFICANT CHANGE UP (ref 0.5–1.3)
CULTURE RESULTS: NO GROWTH — SIGNIFICANT CHANGE UP
EOSINOPHIL # BLD AUTO: 0.3 K/UL — SIGNIFICANT CHANGE UP (ref 0–0.5)
EOSINOPHIL NFR BLD AUTO: 6.1 % — HIGH (ref 0–6)
GLUCOSE BLDC GLUCOMTR-MCNC: 148 MG/DL — HIGH (ref 70–99)
GLUCOSE BLDC GLUCOMTR-MCNC: 155 MG/DL — HIGH (ref 70–99)
GLUCOSE SERPL-MCNC: 148 MG/DL — HIGH (ref 70–99)
HCT VFR BLD CALC: 35 % — LOW (ref 39–50)
HGB BLD-MCNC: 13 G/DL — SIGNIFICANT CHANGE UP (ref 13–17)
LYMPHOCYTES # BLD AUTO: 2.1 K/UL — SIGNIFICANT CHANGE UP (ref 1–3.3)
LYMPHOCYTES # BLD AUTO: 38.2 % — SIGNIFICANT CHANGE UP (ref 13–44)
MCHC RBC-ENTMCNC: 35.3 PG — HIGH (ref 27–34)
MCHC RBC-ENTMCNC: 37 GM/DL — HIGH (ref 32–36)
MCV RBC AUTO: 95.3 FL — SIGNIFICANT CHANGE UP (ref 80–100)
MONOCYTES # BLD AUTO: 0.3 K/UL — SIGNIFICANT CHANGE UP (ref 0–0.9)
MONOCYTES NFR BLD AUTO: 5.9 % — SIGNIFICANT CHANGE UP (ref 2–14)
NEUTROPHILS # BLD AUTO: 2.6 K/UL — SIGNIFICANT CHANGE UP (ref 1.8–7.4)
NEUTROPHILS NFR BLD AUTO: 49.1 % — SIGNIFICANT CHANGE UP (ref 43–77)
PLATELET # BLD AUTO: 106 K/UL — LOW (ref 150–400)
POTASSIUM SERPL-MCNC: 4.3 MMOL/L — SIGNIFICANT CHANGE UP (ref 3.5–5.3)
POTASSIUM SERPL-SCNC: 4.3 MMOL/L — SIGNIFICANT CHANGE UP (ref 3.5–5.3)
RBC # BLD: 3.68 M/UL — LOW (ref 4.2–5.8)
RBC # FLD: 12.3 % — SIGNIFICANT CHANGE UP (ref 10.3–14.5)
SODIUM SERPL-SCNC: 143 MMOL/L — SIGNIFICANT CHANGE UP (ref 135–145)
SPECIMEN SOURCE: SIGNIFICANT CHANGE UP
WBC # BLD: 5.4 K/UL — SIGNIFICANT CHANGE UP (ref 3.8–10.5)
WBC # FLD AUTO: 5.4 K/UL — SIGNIFICANT CHANGE UP (ref 3.8–10.5)

## 2019-03-14 PROCEDURE — 93005 ELECTROCARDIOGRAM TRACING: CPT

## 2019-03-14 PROCEDURE — 84132 ASSAY OF SERUM POTASSIUM: CPT

## 2019-03-14 PROCEDURE — 74176 CT ABD & PELVIS W/O CONTRAST: CPT

## 2019-03-14 PROCEDURE — 82330 ASSAY OF CALCIUM: CPT

## 2019-03-14 PROCEDURE — 85027 COMPLETE CBC AUTOMATED: CPT

## 2019-03-14 PROCEDURE — 87086 URINE CULTURE/COLONY COUNT: CPT

## 2019-03-14 PROCEDURE — 84295 ASSAY OF SERUM SODIUM: CPT

## 2019-03-14 PROCEDURE — 83605 ASSAY OF LACTIC ACID: CPT

## 2019-03-14 PROCEDURE — 96375 TX/PRO/DX INJ NEW DRUG ADDON: CPT

## 2019-03-14 PROCEDURE — 87040 BLOOD CULTURE FOR BACTERIA: CPT

## 2019-03-14 PROCEDURE — 82962 GLUCOSE BLOOD TEST: CPT

## 2019-03-14 PROCEDURE — 99231 SBSQ HOSP IP/OBS SF/LOW 25: CPT

## 2019-03-14 PROCEDURE — 85610 PROTHROMBIN TIME: CPT

## 2019-03-14 PROCEDURE — 83036 HEMOGLOBIN GLYCOSYLATED A1C: CPT

## 2019-03-14 PROCEDURE — 80048 BASIC METABOLIC PNL TOTAL CA: CPT

## 2019-03-14 PROCEDURE — 99285 EMERGENCY DEPT VISIT HI MDM: CPT | Mod: 25

## 2019-03-14 PROCEDURE — 82435 ASSAY OF BLOOD CHLORIDE: CPT

## 2019-03-14 PROCEDURE — 82803 BLOOD GASES ANY COMBINATION: CPT

## 2019-03-14 PROCEDURE — 85730 THROMBOPLASTIN TIME PARTIAL: CPT

## 2019-03-14 PROCEDURE — 81001 URINALYSIS AUTO W/SCOPE: CPT

## 2019-03-14 PROCEDURE — 85014 HEMATOCRIT: CPT

## 2019-03-14 PROCEDURE — 96374 THER/PROPH/DIAG INJ IV PUSH: CPT

## 2019-03-14 PROCEDURE — 80053 COMPREHEN METABOLIC PANEL: CPT

## 2019-03-14 PROCEDURE — 82565 ASSAY OF CREATININE: CPT

## 2019-03-14 PROCEDURE — G0378: CPT

## 2019-03-14 PROCEDURE — 82947 ASSAY GLUCOSE BLOOD QUANT: CPT

## 2019-03-14 RX ORDER — SENNA PLUS 8.6 MG/1
2 TABLET ORAL
Qty: 0 | Refills: 0 | COMMUNITY
Start: 2019-03-14

## 2019-03-14 RX ORDER — CEFDINIR 250 MG/5ML
1 POWDER, FOR SUSPENSION ORAL
Qty: 14 | Refills: 0 | OUTPATIENT
Start: 2019-03-14 | End: 2019-03-20

## 2019-03-14 RX ORDER — INSULIN LISPRO 100/ML
VIAL (ML) SUBCUTANEOUS
Qty: 0 | Refills: 0 | Status: DISCONTINUED | OUTPATIENT
Start: 2019-03-14 | End: 2019-03-14

## 2019-03-14 RX ORDER — INSULIN LISPRO 100/ML
VIAL (ML) SUBCUTANEOUS AT BEDTIME
Qty: 0 | Refills: 0 | Status: DISCONTINUED | OUTPATIENT
Start: 2019-03-14 | End: 2019-03-14

## 2019-03-14 RX ADMIN — HEPARIN SODIUM 5000 UNIT(S): 5000 INJECTION INTRAVENOUS; SUBCUTANEOUS at 05:19

## 2019-03-14 RX ADMIN — SODIUM CHLORIDE 125 MILLILITER(S): 9 INJECTION, SOLUTION INTRAVENOUS at 08:04

## 2019-03-14 RX ADMIN — Medication 15 UNIT(S): at 07:59

## 2019-03-14 RX ADMIN — Medication 15 UNIT(S): at 13:10

## 2019-03-14 RX ADMIN — Medication 81 MILLIGRAM(S): at 13:12

## 2019-03-14 RX ADMIN — LOSARTAN POTASSIUM 50 MILLIGRAM(S): 100 TABLET, FILM COATED ORAL at 05:19

## 2019-03-14 NOTE — DISCHARGE NOTE PROVIDER - HOSPITAL COURSE
49 yo male who arrived at [facility] on 3/12/19 with right renal colic and found with 1 cm intrarenal stone & fever. He was admitted and observed on antibiotics. Labs and vitals were stable. Was discharged on empiric abx as cx were negative

## 2019-03-14 NOTE — DISCHARGE NOTE PROVIDER - NSDCCPCAREPLAN_GEN_ALL_CORE_FT
PRINCIPAL DISCHARGE DIAGNOSIS  Diagnosis: Sepsis  Assessment and Plan of Treatment: remain infection free  continue antibiotics      SECONDARY DISCHARGE DIAGNOSES  Diagnosis: Kidney stone  Assessment and Plan of Treatment: Call the office if you experience fever, chills, uncontrolled pain, the inability to tolerate liquids, or the urine does not flow  Follow up with DR. Pierce or one of the urologist within one to two weeks of discharge. [phone]

## 2019-03-14 NOTE — DISCHARGE NOTE PROVIDER - CARE PROVIDER_API CALL
Jose Pierce (MD)  Urology  [address], [address] [place] [address]  Phone: [phone]  Fax: [phone]  Follow Up Time:

## 2019-03-14 NOTE — DISCHARGE NOTE NURSING/CASE MANAGEMENT/SOCIAL WORK - NSDCPNDISPN_GEN_ALL_CORE
Education provided on the pain management plan of care/Side effects of pain management treatment/Safe use, storage and disposal of opioids when prescribed/Activities of daily living, including home environment that might     exacerbate pain or reduce effectiveness of the pain management plan of care as well as strategies to address these issues/Opioids not applicable/not prescribed

## 2019-03-14 NOTE — DISCHARGE NOTE NURSING/CASE MANAGEMENT/SOCIAL WORK - NSDCDPATPORTLINK_GEN_ALL_CORE
You can access the [facility] Patient Portal, offered by [facility], by registering with the following website: [email or web address]

## 2019-03-18 LAB
CULTURE RESULTS: SIGNIFICANT CHANGE UP
CULTURE RESULTS: SIGNIFICANT CHANGE UP
SPECIMEN SOURCE: SIGNIFICANT CHANGE UP
SPECIMEN SOURCE: SIGNIFICANT CHANGE UP

## 2019-03-19 ENCOUNTER — APPOINTMENT (OUTPATIENT)
Dept: UROLOGY | Facility: CLINIC | Age: 49
End: 2019-03-19
Payer: COMMERCIAL

## 2019-03-19 VITALS
RESPIRATION RATE: 18 BRPM | BODY MASS INDEX: 31.49 KG/M2 | HEART RATE: 97 BPM | SYSTOLIC BLOOD PRESSURE: 112 MMHG | DIASTOLIC BLOOD PRESSURE: 71 MMHG | HEIGHT: 74.5 IN | WEIGHT: 248 LBS

## 2019-03-19 PROCEDURE — 99215 OFFICE O/P EST HI 40 MIN: CPT

## 2019-03-19 RX ORDER — FLASH GLUCOSE SENSOR
KIT MISCELLANEOUS
Qty: 3 | Refills: 0 | Status: ACTIVE | COMMUNITY
Start: 2018-07-23

## 2019-03-19 RX ORDER — GLUCAGON 1 MG
1 KIT INJECTION
Qty: 1 | Refills: 0 | Status: ACTIVE | COMMUNITY
Start: 2019-01-09

## 2019-03-19 NOTE — PHYSICAL EXAM
[General Appearance - Well Developed] : well developed [General Appearance - Well Nourished] : well nourished [Normal Appearance] : normal appearance [Well Groomed] : well groomed [General Appearance - In No Acute Distress] : no acute distress [Abdomen Soft] : soft [Abdomen Tenderness] : non-tender [Costovertebral Angle Tenderness] : no ~M costovertebral angle tenderness [Urinary Bladder Findings] : the bladder was normal on palpation [Edema] : no peripheral edema [] : no respiratory distress [Respiration, Rhythm And Depth] : normal respiratory rhythm and effort [Exaggerated Use Of Accessory Muscles For Inspiration] : no accessory muscle use [Oriented To Time, Place, And Person] : oriented to person, place, and time [Affect] : the affect was normal [Not Anxious] : not anxious [Mood] : the mood was normal [Normal Station and Gait] : the gait and station were normal for the patient's age [No Focal Deficits] : no focal deficits [No Palpable Adenopathy] : no palpable adenopathy

## 2019-03-19 NOTE — ASSESSMENT
[FreeTextEntry1] : Very pleasant 48-year-old gentleman who presents for followup from recent hospitalization for bilateral kidney stones and UTI\par -CT images reviewed with the patient\par -Labs reviewed\par -Continue cefdinir given UTI and stones\par -I discussed the different treatment modalities for nephrolithiasis with the patient, including medical management, spontaneous stone passage, percutaneous stone extraction, extracorporeal shock wave lithotripsy, and ureteroscopy with laser lithotripsy and stone extraction. Given the size and location of the stone, I recommend and the patient opted to proceed with ureteroscopy.  The risks, benefits, and alternatives to ureteroscopy were discussed with the patient, including but not limited to pain, infection, bleeding, bladder injury, ureteral injury, renal injury, and treatment failure.  I also discussed the possible need for a temporary ureteral stent.  I discussed the possible side effects of a temporary ureteral stent, including flank pain, hematuria, and bladder spasms.  The patient understands that a stent is a temporary implant that must be removed in the future.  The patient wishes to proceed and we will schedule the surgery for the near future.

## 2019-03-21 ENCOUNTER — OUTPATIENT (OUTPATIENT)
Dept: OUTPATIENT SERVICES | Facility: HOSPITAL | Age: 49
LOS: 1 days | End: 2019-03-21
Payer: COMMERCIAL

## 2019-03-21 VITALS
HEIGHT: 74.5 IN | SYSTOLIC BLOOD PRESSURE: 113 MMHG | OXYGEN SATURATION: 98 % | RESPIRATION RATE: 18 BRPM | WEIGHT: 257.94 LBS | HEART RATE: 93 BPM | TEMPERATURE: 99 F | DIASTOLIC BLOOD PRESSURE: 83 MMHG

## 2019-03-21 DIAGNOSIS — T14.8XXA OTHER INJURY OF UNSPECIFIED BODY REGION, INITIAL ENCOUNTER: Chronic | ICD-10-CM

## 2019-03-21 DIAGNOSIS — Z90.89 ACQUIRED ABSENCE OF OTHER ORGANS: Chronic | ICD-10-CM

## 2019-03-21 DIAGNOSIS — N20.0 CALCULUS OF KIDNEY: ICD-10-CM

## 2019-03-21 DIAGNOSIS — Z01.818 ENCOUNTER FOR OTHER PREPROCEDURAL EXAMINATION: ICD-10-CM

## 2019-03-21 DIAGNOSIS — Z87.09 PERSONAL HISTORY OF OTHER DISEASES OF THE RESPIRATORY SYSTEM: Chronic | ICD-10-CM

## 2019-03-21 DIAGNOSIS — E11.9 TYPE 2 DIABETES MELLITUS WITHOUT COMPLICATIONS: ICD-10-CM

## 2019-03-21 PROCEDURE — G0463: CPT

## 2019-03-21 RX ORDER — ASPIRIN/CALCIUM CARB/MAGNESIUM 324 MG
1 TABLET ORAL
Qty: 0 | Refills: 0 | COMMUNITY

## 2019-03-21 RX ORDER — LIDOCAINE HCL 20 MG/ML
0.2 VIAL (ML) INJECTION ONCE
Qty: 0 | Refills: 0 | Status: DISCONTINUED | OUTPATIENT
Start: 2019-03-28 | End: 2019-03-28

## 2019-03-21 RX ORDER — SODIUM CHLORIDE 9 MG/ML
3 INJECTION INTRAMUSCULAR; INTRAVENOUS; SUBCUTANEOUS EVERY 8 HOURS
Qty: 0 | Refills: 0 | Status: DISCONTINUED | OUTPATIENT
Start: 2019-03-28 | End: 2019-03-28

## 2019-03-21 RX ORDER — CEFAZOLIN SODIUM 1 G
2000 VIAL (EA) INJECTION ONCE
Qty: 0 | Refills: 0 | Status: DISCONTINUED | OUTPATIENT
Start: 2019-03-28 | End: 2019-04-12

## 2019-03-21 NOTE — H&P PST ADULT - NSICDXPASTMEDICALHX_GEN_ALL_CORE_FT
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

## 2019-03-21 NOTE — H&P PST ADULT - NSICDXPASTSURGICALHX_GEN_ALL_CORE_FT
PAST SURGICAL HISTORY:  Fragmented bone S/P right elbow surgery    H/O pleural empyema S/P right CT drainage    S/P tonsillectomy

## 2019-03-21 NOTE — DISCHARGE NOTE NURSING/CASE MANAGEMENT/SOCIAL WORK - NSDCVIVACCINE_GEN_ALL_CORE_FT
Date of Surgery Update:  Vinicio Mtz was seen and examined. History and physical has been reviewed. The patient has been examined.  There have been no significant clinical changes since the completion of the originally dated History and Physical.    Signed By: Oniel Machado MD     March 21, 2019 11:33 AM
No Vaccines Administered.

## 2019-03-21 NOTE — H&P PST ADULT - HISTORY OF PRESENT ILLNESS
This is a 47 y/o M PMHx HLD, Type I Diabetes, squamous cell CA s/p removal [2016], renal calculi, kidney infection. Pt was hospitalized last week for a kidney infection with fever, chills, and hematuria. Pt being treated with cefdinir, found to have 10 mm right renal calculus and several non-obstructing calculi on CT scan. Pt currently reporting a 3/10 dull aching pain on the R side of the back. The pain is occurring intermittently and radiates to the abdomen. The pt states that Motrin alleviates the pain. Pt denies dysuria.

## 2019-03-21 NOTE — H&P PST ADULT - NSICDXPROBLEM_GEN_ALL_CORE_FT
PROBLEM DIAGNOSES  Problem: Calculus of kidney  Assessment and Plan: Cystoscopy, bilateral ureteroscopy    Problem: DM (diabetes mellitus)  Assessment and Plan: Instructed to hold metformin the nigh before and morning of surgery and take 80% of Lantus, holding Humalog

## 2019-03-22 PROBLEM — J86.9 PYOTHORAX WITHOUT FISTULA: Chronic | Status: ACTIVE | Noted: 2019-03-21

## 2019-03-22 PROBLEM — C44.92 SQUAMOUS CELL CARCINOMA OF SKIN, UNSPECIFIED: Chronic | Status: ACTIVE | Noted: 2019-03-21

## 2019-03-22 PROBLEM — G62.9 POLYNEUROPATHY, UNSPECIFIED: Chronic | Status: ACTIVE | Noted: 2019-03-21

## 2019-03-25 ENCOUNTER — NON-APPOINTMENT (OUTPATIENT)
Age: 49
End: 2019-03-25

## 2019-03-25 ENCOUNTER — APPOINTMENT (OUTPATIENT)
Dept: CARDIOLOGY | Facility: CLINIC | Age: 49
End: 2019-03-25
Payer: COMMERCIAL

## 2019-03-25 VITALS
HEART RATE: 84 BPM | OXYGEN SATURATION: 98 % | BODY MASS INDEX: 32.56 KG/M2 | TEMPERATURE: 98.4 F | SYSTOLIC BLOOD PRESSURE: 112 MMHG | DIASTOLIC BLOOD PRESSURE: 61 MMHG | WEIGHT: 257 LBS

## 2019-03-25 PROBLEM — Z86.79 PERSONAL HISTORY OF OTHER DISEASES OF THE CIRCULATORY SYSTEM: Chronic | Status: ACTIVE | Noted: 2019-03-21

## 2019-03-25 PROBLEM — N20.0 CALCULUS OF KIDNEY: Chronic | Status: ACTIVE | Noted: 2019-03-21

## 2019-03-25 PROBLEM — Z87.440 PERSONAL HISTORY OF URINARY (TRACT) INFECTIONS: Chronic | Status: ACTIVE | Noted: 2019-03-21

## 2019-03-25 PROCEDURE — 93000 ELECTROCARDIOGRAM COMPLETE: CPT

## 2019-03-25 PROCEDURE — 99215 OFFICE O/P EST HI 40 MIN: CPT

## 2019-03-25 RX ORDER — AMOXICILLIN AND CLAVULANATE POTASSIUM 875; 125 MG/1; MG/1
875-125 TABLET, COATED ORAL
Qty: 28 | Refills: 0 | Status: DISCONTINUED | COMMUNITY
Start: 2019-01-30 | End: 2019-03-25

## 2019-03-25 RX ORDER — TAMSULOSIN HYDROCHLORIDE 0.4 MG/1
0.4 CAPSULE ORAL
Qty: 14 | Refills: 0 | Status: DISCONTINUED | COMMUNITY
Start: 2019-03-11 | End: 2019-03-25

## 2019-03-25 RX ORDER — SEMAGLUTIDE 1.34 MG/ML
2 INJECTION, SOLUTION SUBCUTANEOUS
Qty: 4 | Refills: 0 | Status: DISCONTINUED | COMMUNITY
Start: 2018-10-16 | End: 2019-03-25

## 2019-03-25 RX ORDER — AZITHROMYCIN 250 MG/1
250 TABLET, FILM COATED ORAL
Qty: 6 | Refills: 0 | Status: DISCONTINUED | COMMUNITY
Start: 2016-12-29 | End: 2019-03-25

## 2019-03-25 RX ORDER — LEVOFLOXACIN 750 MG/1
750 TABLET, FILM COATED ORAL
Qty: 7 | Refills: 0 | Status: DISCONTINUED | COMMUNITY
Start: 2017-01-03 | End: 2019-03-25

## 2019-03-25 RX ORDER — CEPHALEXIN 500 MG/1
500 CAPSULE ORAL
Qty: 21 | Refills: 0 | Status: DISCONTINUED | COMMUNITY
Start: 2018-12-07 | End: 2019-03-25

## 2019-03-25 RX ORDER — CEFDINIR 300 MG/1
300 CAPSULE ORAL
Qty: 14 | Refills: 0 | Status: DISCONTINUED | COMMUNITY
Start: 2019-03-14 | End: 2019-03-25

## 2019-03-25 RX ORDER — OFLOXACIN 3 MG/ML
0.3 SOLUTION/ DROPS OPHTHALMIC
Qty: 5 | Refills: 0 | Status: DISCONTINUED | COMMUNITY
Start: 2018-12-07 | End: 2019-03-25

## 2019-03-25 RX ORDER — DULAGLUTIDE 1.5 MG/.5ML
1.5 INJECTION, SOLUTION SUBCUTANEOUS
Qty: 1 | Refills: 3 | Status: DISCONTINUED | COMMUNITY
Start: 2017-01-11 | End: 2019-03-25

## 2019-03-25 RX ORDER — VALACYCLOVIR 1 G/1
1 TABLET, FILM COATED ORAL
Qty: 21 | Refills: 0 | Status: DISCONTINUED | COMMUNITY
Start: 2018-12-10 | End: 2019-03-25

## 2019-03-25 NOTE — REVIEW OF SYSTEMS
[see HPI] : see HPI [Shortness Of Breath] : no shortness of breath [Dyspnea on exertion] : not dyspnea during exertion [Chest Pain] : no chest pain [Lower Ext Edema] : no extremity edema [Negative] : Heme/Lymph

## 2019-03-25 NOTE — DISCUSSION/SUMMARY
[FreeTextEntry1] : He is a 48-year-old with diabetes mellitus and a left toe ulcer who presents for cardiac evaluation prior to his planned nephrolithiasis and cystoscopy.\par His ECG is unremarkable.\par He is stable from a cardiovascular standpoint to undergo the planned surgery. I agree with discontinuing his aspirin 7 days prior to surgery. He should restart aspirin therapy when instructed by the surgeon.\par No further cardiac testing is required prior to his surgery.\par \par I have suggested for routine followup that he be followed by vascular doctor and peripheral arterial disease screening be performed.

## 2019-03-25 NOTE — PHYSICAL EXAM
[General Appearance - Well Developed] : well developed [General Appearance - Well Nourished] : well nourished [General Appearance - In No Acute Distress] : no acute distress [Normal Conjunctiva] : the conjunctiva exhibited no abnormalities [No Oral Pallor] : no oral pallor [Normal Jugular Venous V Waves Present] : normal jugular venous V waves present [Respiration, Rhythm And Depth] : normal respiratory rhythm and effort [Auscultation Breath Sounds / Voice Sounds] : lungs were clear to auscultation bilaterally [Heart Rate And Rhythm] : heart rate and rhythm were normal [Heart Sounds] : normal S1 and S2 [Murmurs] : no murmurs present [Arterial Pulses Normal] : the arterial pulses were normal [Edema] : no peripheral edema present [Bowel Sounds] : normal bowel sounds [Abdomen Soft] : soft [Abnormal Walk] : normal gait [Cyanosis, Localized] : no localized cyanosis [Skin Turgor] : normal skin turgor [Oriented To Time, Place, And Person] : oriented to person, place, and time [Impaired Insight] : insight and judgment were intact [Affect] : the affect was normal

## 2019-03-27 ENCOUNTER — TRANSCRIPTION ENCOUNTER (OUTPATIENT)
Age: 49
End: 2019-03-27

## 2019-03-28 ENCOUNTER — APPOINTMENT (OUTPATIENT)
Dept: UROLOGY | Facility: HOSPITAL | Age: 49
End: 2019-03-28

## 2019-03-28 ENCOUNTER — RESULT REVIEW (OUTPATIENT)
Age: 49
End: 2019-03-28

## 2019-03-28 ENCOUNTER — OUTPATIENT (OUTPATIENT)
Dept: OUTPATIENT SERVICES | Facility: HOSPITAL | Age: 49
LOS: 1 days | End: 2019-03-28
Payer: COMMERCIAL

## 2019-03-28 VITALS
OXYGEN SATURATION: 98 % | SYSTOLIC BLOOD PRESSURE: 137 MMHG | HEART RATE: 88 BPM | RESPIRATION RATE: 16 BRPM | HEIGHT: 74.5 IN | WEIGHT: 257.94 LBS | DIASTOLIC BLOOD PRESSURE: 91 MMHG | TEMPERATURE: 98 F

## 2019-03-28 VITALS
DIASTOLIC BLOOD PRESSURE: 73 MMHG | OXYGEN SATURATION: 96 % | SYSTOLIC BLOOD PRESSURE: 122 MMHG | HEART RATE: 78 BPM | TEMPERATURE: 97 F | RESPIRATION RATE: 16 BRPM

## 2019-03-28 DIAGNOSIS — T14.8XXA OTHER INJURY OF UNSPECIFIED BODY REGION, INITIAL ENCOUNTER: Chronic | ICD-10-CM

## 2019-03-28 DIAGNOSIS — Z87.09 PERSONAL HISTORY OF OTHER DISEASES OF THE RESPIRATORY SYSTEM: Chronic | ICD-10-CM

## 2019-03-28 DIAGNOSIS — Z90.89 ACQUIRED ABSENCE OF OTHER ORGANS: Chronic | ICD-10-CM

## 2019-03-28 DIAGNOSIS — N20.0 CALCULUS OF KIDNEY: ICD-10-CM

## 2019-03-28 PROCEDURE — 52356 CYSTO/URETERO W/LITHOTRIPSY: CPT | Mod: 50

## 2019-03-28 PROCEDURE — 74420 UROGRAPHY RTRGR +-KUB: CPT | Mod: 26

## 2019-03-28 PROCEDURE — 88300 SURGICAL PATH GROSS: CPT | Mod: 26

## 2019-03-28 RX ORDER — METFORMIN HYDROCHLORIDE 850 MG/1
1000 TABLET ORAL
Qty: 0 | Refills: 0 | COMMUNITY

## 2019-03-28 RX ORDER — INSULIN GLARGINE 100 [IU]/ML
60 INJECTION, SOLUTION SUBCUTANEOUS
Qty: 0 | Refills: 0 | COMMUNITY

## 2019-03-28 RX ORDER — DOCUSATE SODIUM 100 MG
1 CAPSULE ORAL
Qty: 0 | Refills: 0 | COMMUNITY

## 2019-03-28 RX ORDER — OLMESARTAN MEDOXOMIL 5 MG/1
1 TABLET, FILM COATED ORAL
Qty: 0 | Refills: 0 | COMMUNITY

## 2019-03-28 RX ORDER — IBUPROFEN 200 MG
1 TABLET ORAL
Qty: 0 | Refills: 0 | COMMUNITY

## 2019-03-28 RX ORDER — HYDROMORPHONE HYDROCHLORIDE 2 MG/ML
0.25 INJECTION INTRAMUSCULAR; INTRAVENOUS; SUBCUTANEOUS
Qty: 0 | Refills: 0 | Status: DISCONTINUED | OUTPATIENT
Start: 2019-03-28 | End: 2019-03-28

## 2019-03-28 RX ORDER — INSULIN LISPRO 100/ML
15 VIAL (ML) SUBCUTANEOUS
Qty: 0 | Refills: 0 | COMMUNITY

## 2019-03-28 RX ORDER — ATORVASTATIN CALCIUM 80 MG/1
1 TABLET, FILM COATED ORAL
Qty: 0 | Refills: 0 | COMMUNITY

## 2019-03-28 RX ORDER — FENTANYL CITRATE 50 UG/ML
25 INJECTION INTRAVENOUS
Qty: 0 | Refills: 0 | Status: DISCONTINUED | OUTPATIENT
Start: 2019-03-28 | End: 2019-03-28

## 2019-03-28 RX ORDER — CEFDINIR 250 MG/5ML
1 POWDER, FOR SUSPENSION ORAL
Qty: 10 | Refills: 0 | OUTPATIENT
Start: 2019-03-28 | End: 2019-04-01

## 2019-03-28 RX ORDER — SEMAGLUTIDE 0.68 MG/ML
1 INJECTION, SOLUTION SUBCUTANEOUS
Qty: 0 | Refills: 0 | COMMUNITY

## 2019-03-28 RX ORDER — SODIUM CHLORIDE 9 MG/ML
1000 INJECTION, SOLUTION INTRAVENOUS
Qty: 0 | Refills: 0 | Status: DISCONTINUED | OUTPATIENT
Start: 2019-03-28 | End: 2019-04-12

## 2019-03-28 RX ORDER — ONDANSETRON 8 MG/1
4 TABLET, FILM COATED ORAL ONCE
Qty: 0 | Refills: 0 | Status: COMPLETED | OUTPATIENT
Start: 2019-03-28 | End: 2019-03-28

## 2019-03-28 RX ADMIN — ONDANSETRON 4 MILLIGRAM(S): 8 TABLET, FILM COATED ORAL at 10:29

## 2019-03-28 RX ADMIN — HYDROMORPHONE HYDROCHLORIDE 0.25 MILLIGRAM(S): 2 INJECTION INTRAMUSCULAR; INTRAVENOUS; SUBCUTANEOUS at 10:29

## 2019-03-28 RX ADMIN — HYDROMORPHONE HYDROCHLORIDE 0.25 MILLIGRAM(S): 2 INJECTION INTRAMUSCULAR; INTRAVENOUS; SUBCUTANEOUS at 10:43

## 2019-03-28 RX ADMIN — HYDROMORPHONE HYDROCHLORIDE 0.25 MILLIGRAM(S): 2 INJECTION INTRAMUSCULAR; INTRAVENOUS; SUBCUTANEOUS at 11:11

## 2019-03-28 RX ADMIN — HYDROMORPHONE HYDROCHLORIDE 0.25 MILLIGRAM(S): 2 INJECTION INTRAMUSCULAR; INTRAVENOUS; SUBCUTANEOUS at 10:41

## 2019-03-28 NOTE — ASU DISCHARGE PLAN (ADULT/PEDIATRIC) - CARE PROVIDER_API CALL
oJse Pierce (MD)  Urology  [address], [address] [place] [address]  Phone: [phone]  Fax: [phone]  Follow Up Time:

## 2019-03-28 NOTE — ASU DISCHARGE PLAN (ADULT/PEDIATRIC) - NURSING INSTRUCTIONS
You were given IV Tylenol for pain management.  Please DO NOT take tylenol or products that contain tylenol for the next 6-8 hours (until 3:30pm). Please do not exceed 4000mg in 24hours.

## 2019-03-28 NOTE — ASU DISCHARGE PLAN (ADULT/PEDIATRIC) - COMMENTS
It is common to have blood in the urine after your procedure. It may be pink or red.  If you have significant amount of clots in urine, difficulty urinating or unable to urinate inform your doctor. Drink plenty of liquids.    Do not perform strenous activities or resume sexual activity until you are cleared by your doctor.

## 2019-03-28 NOTE — BRIEF OPERATIVE NOTE - OPERATION/FINDINGS
Cystoscopy, bilateral ureteroscopy, laser lithotripsy, stone extraction, stent placement, retrograde pyelogram

## 2019-03-28 NOTE — ASU DISCHARGE PLAN (ADULT/PEDIATRIC) - ASU DC SPECIAL INSTRUCTIONSFT
Please see Dr. Pierce on Friday for stent removal. Please call to make this appointment Please see Dr. Pierce on Friday for stent removal. Please call to make this appointment  It is common to have blood in the urine after your procedure. It may be pink or red.  If you have significant amount of clots in urine, difficulty urinating or unable to urinate inform your doctor. Drink plenty of liquids.    Do not perform strenous activities until you are cleared by your doctor.

## 2019-03-28 NOTE — ASU DISCHARGE PLAN (ADULT/PEDIATRIC) - CALL YOUR DOCTOR IF YOU HAVE ANY OF THE FOLLOWING:
Pain not relieved by Medications/Inability to tolerate liquids or foods/Nausea and vomiting that does not stop/Bleeding that does not stop/Unable to urinate/Fever greater than (need to indicate Fahrenheit or Celsius)

## 2019-03-29 ENCOUNTER — MESSAGE (OUTPATIENT)
Age: 49
End: 2019-03-29

## 2019-04-01 PROCEDURE — 52356 CYSTO/URETERO W/LITHOTRIPSY: CPT | Mod: 50

## 2019-04-01 PROCEDURE — C1769: CPT

## 2019-04-01 PROCEDURE — C2617: CPT

## 2019-04-01 PROCEDURE — 82365 CALCULUS SPECTROSCOPY: CPT

## 2019-04-01 PROCEDURE — C1758: CPT

## 2019-04-01 PROCEDURE — 82962 GLUCOSE BLOOD TEST: CPT

## 2019-04-01 PROCEDURE — 76000 FLUOROSCOPY <1 HR PHYS/QHP: CPT

## 2019-04-01 PROCEDURE — 88300 SURGICAL PATH GROSS: CPT

## 2019-04-01 PROCEDURE — C1889: CPT

## 2019-04-05 ENCOUNTER — APPOINTMENT (OUTPATIENT)
Dept: UROLOGY | Facility: CLINIC | Age: 49
End: 2019-04-05
Payer: COMMERCIAL

## 2019-04-05 PROCEDURE — 52315 CYSTOSCOPY AND TREATMENT: CPT

## 2019-04-10 ENCOUNTER — CLINICAL ADVICE (OUTPATIENT)
Age: 49
End: 2019-04-10

## 2019-05-01 ENCOUNTER — APPOINTMENT (OUTPATIENT)
Dept: CARDIOLOGY | Facility: CLINIC | Age: 49
End: 2019-05-01

## 2019-05-09 ENCOUNTER — NON-APPOINTMENT (OUTPATIENT)
Age: 49
End: 2019-05-09

## 2019-05-09 ENCOUNTER — APPOINTMENT (OUTPATIENT)
Dept: CARDIOLOGY | Facility: CLINIC | Age: 49
End: 2019-05-09
Payer: COMMERCIAL

## 2019-05-09 VITALS
SYSTOLIC BLOOD PRESSURE: 100 MMHG | OXYGEN SATURATION: 99 % | BODY MASS INDEX: 32.08 KG/M2 | HEIGHT: 74 IN | HEART RATE: 96 BPM | DIASTOLIC BLOOD PRESSURE: 60 MMHG | WEIGHT: 250 LBS

## 2019-05-09 DIAGNOSIS — L97.529 NON-PRESSURE CHRONIC ULCER OF OTHER PART OF LEFT FOOT WITH UNSPECIFIED SEVERITY: ICD-10-CM

## 2019-05-09 PROCEDURE — 99215 OFFICE O/P EST HI 40 MIN: CPT

## 2019-05-09 NOTE — PHYSICAL EXAM
[General Appearance - Well Developed] : well developed [Normal Appearance] : normal appearance [Well Groomed] : well groomed [General Appearance - Well Nourished] : well nourished [No Deformities] : no deformities [Normal Conjunctiva] : the conjunctiva exhibited no abnormalities [General Appearance - In No Acute Distress] : no acute distress [Eyelids - No Xanthelasma] : the eyelids demonstrated no xanthelasmas [Normal Oral Mucosa] : normal oral mucosa [No Oral Pallor] : no oral pallor [Normal Jugular Venous A Waves Present] : normal jugular venous A waves present [No Oral Cyanosis] : no oral cyanosis [Normal Jugular Venous V Waves Present] : normal jugular venous V waves present [No Jugular Venous Eng A Waves] : no jugular venous eng A waves [Heart Rate And Rhythm] : heart rate and rhythm were normal [Heart Sounds] : normal S1 and S2 [Murmurs] : no murmurs present [Respiration, Rhythm And Depth] : normal respiratory rhythm and effort [Exaggerated Use Of Accessory Muscles For Inspiration] : no accessory muscle use [Auscultation Breath Sounds / Voice Sounds] : lungs were clear to auscultation bilaterally [Abdomen Soft] : soft [Abdomen Tenderness] : non-tender [Abdomen Mass (___ Cm)] : no abdominal mass palpated [Abnormal Walk] : normal gait [Nail Clubbing] : no clubbing of the fingernails [Gait - Sufficient For Exercise Testing] : the gait was sufficient for exercise testing [Cyanosis, Localized] : no localized cyanosis [Petechial Hemorrhages (___cm)] : no petechial hemorrhages [] : no rash [Skin Color & Pigmentation] : normal skin color and pigmentation [No Venous Stasis] : no venous stasis [No Xanthoma] : no  xanthoma was observed [No Skin Ulcers] : no skin ulcer [Skin Lesions] : no skin lesions [FreeTextEntry1] : Strong DP bilaterally.  Pop is palpable.  CFA palpable.  No bruits.  Cap refill<1second

## 2019-05-09 NOTE — REASON FOR VISIT
[Initial Evaluation] : an initial evaluation of [FreeTextEntry1] : 48M AUSTEN, HTN, HLD, strong family history of coronary disease followed by Dr. Lisker.  States that he was seen by his podiatrist, he developed an ulcer January 2019 (pdx [facility]).  The ulcer has healed with rest and antibiotics.  The ulcer was on the plantar aspect of the toe.  Was treated with debridement.  Was told he needs to have a straightening of the toe.  The surgery is scheduled in the end of May.  In office procedure.  Denies any claudication.  He has a photo of the ulcveration - located on the bottom of the second digit (now healed). He has not had recently had vascular testing.  He reports an GUNNER ten years ago.  He has not coronary or arterial stents.

## 2019-05-09 NOTE — ASSESSMENT
[FreeTextEntry1] : Assessment:\par 1.  Neuropathic L 2nd digit ulceration\par - healed with local therapy and offlloading\par 2.  Family history of coronary disease\par 3.  Diabetes \par 4.  Hammer toe \par \par \par plan\par 1.  Strong peripheral pulses on exam and brisk cap refill < 1 second.  Will proceed with Gunner/PVR to document perfusion.  If GUNNER is normal, then no further vascular testing prior to tendon release.\par \par Thanks\par \par Jayesh Calhoun \par

## 2019-05-14 ENCOUNTER — APPOINTMENT (OUTPATIENT)
Dept: UROLOGY | Facility: CLINIC | Age: 49
End: 2019-05-14
Payer: COMMERCIAL

## 2019-05-14 VITALS
HEART RATE: 96 BPM | HEIGHT: 74 IN | BODY MASS INDEX: 32.08 KG/M2 | DIASTOLIC BLOOD PRESSURE: 81 MMHG | SYSTOLIC BLOOD PRESSURE: 114 MMHG | WEIGHT: 250 LBS

## 2019-05-14 DIAGNOSIS — N39.0 URINARY TRACT INFECTION, SITE NOT SPECIFIED: ICD-10-CM

## 2019-05-14 PROCEDURE — 99214 OFFICE O/P EST MOD 30 MIN: CPT

## 2019-05-14 RX ORDER — SILDENAFIL 20 MG/1
20 TABLET ORAL DAILY
Qty: 90 | Refills: 0 | Status: ACTIVE | COMMUNITY
Start: 2019-05-14 | End: 1900-01-01

## 2019-05-14 NOTE — HISTORY OF PRESENT ILLNESS
[FreeTextEntry1] : Very pleasant 48-year-old gentleman presents for follow up of kidney stones. Patient feels well. He recently underwent ureteroscopy and stone extraction. Pathology demonstrated 100% calcium oxalate. He denies dysuria. No hematuria. No flank pain or suprapubic pain. He is here to discuss results of stone analysis and further management options, patient also reports erectile dysfunction. This has been present for a number of years now. He reports that he used Viagra 50 mg in the past with significant improvement in his erections.\par \par  [Urinary Retention] : no urinary retention [Urinary Urgency] : no urinary urgency [Urinary Frequency] : no urinary frequency [Nocturia] : no nocturia [Straining] : no straining [Erectile Dysfunction] : Erectile Dysfunction [Dysuria] : no dysuria [Abdominal Pain] : no abdominal pain [Bladder Spasm] : no bladder spasm [Hematuria - Gross] : no gross hematuria [Fever] : no fever [Flank Pain] : flank pain [Fatigue] : no fatigue [Nausea] : no nausea

## 2019-05-14 NOTE — ASSESSMENT
[FreeTextEntry1] : Very pleasant 48-year-old gentleman presents for followup of kidney stones and erectile dysfunction\par -Trial of sildenafil 20 mg increasing up to 100 mg\par -I discussed the risks, benefits, alternatives, and possible side effects of Viagra (sildenafil) therapy with the patient, including but not limited to headache, flushing, upset stomach, blurry vision, change in color vision, vision loss, and priapism with the patient.\par -LithoLink in 2 months\par -Stones labs in 2 months\par -Followup in 2-1/2 months

## 2019-05-14 NOTE — REVIEW OF SYSTEMS
DATE OF VISIT: 01/08/2017

 

SUBJECTIVE: The patient was seen and examined today at the bedside. She

complained of mild weakness.  Her renal function is stable. She continues to have

good urine output. Her weight is going down and edema is improving.

 

REVIEW OF SYSTEMS: The patient denies any fever, chills, rigors, headache,

nausea, vomiting, chest pain. She has baseline shortness of breath. She denies

any pain in the abdomen, constipation or diarrhea. She reports leg edema but she

reports it is significantly getting better and oozing from the left leg is

getting better as well. She did report some cramps in the leg and upper

extremities but her diuretic dose was already decreased yesterday. All other

review of systems is negative.

 

OBJECTIVE:

VITAL SIGNS: Temperature 97.9 degrees Fahrenheit, blood pressure 103/46, pulse

104, respiratory rate 18 saturation 92% on nasal cannula at 2 liters.

INTAKE AND OUTPUT: Urine output recorded as 5.6 liters yesterday and 2.2 liters

so far today since overnight. Weight on the bed scale is 158.7 kg. She is

persistently losing about 1 kg weight every day.

GENERAL: The patient is awake, alert and oriented times three, sitting on the

sofa in no acute distress.

HEAD/NECK: Extraocular muscles intact. Pupils equal, round, reactive to light.

Neck is supple. There is no jugular venous distention (JVD).

CARDIOVASCULAR: S1, S2, regular rate, no murmurs, rubs, gallops.

RESPIRATORY: Chest is clear to auscultation bilaterally, bilateral equal air

entry. No rales or rhonchi.

ABDOMEN: Morbidly obese, soft, positive bowel sounds. Nontender, no organomegaly.

 

EXTREMITIES: The patient has bilateral lower extremity edema which is 1+ and

significantly better than before. She has had edema of the bilateral lower

extremities and she has some blisters and ulcers on the left leg which are still

draining a little bit of tissue fluid but is significant better than before.

CENTRAL NERVOUS SYSTEM (CNS): No focal neurological deficits. Power is 5/5 in all

extremities.

 

LAB REVIEW: CBC showed WBC of 8.5, hemoglobin 8.9, platelets 273.

 

BMP showed sodium 139, potassium 3.6, chloride 95, bicarbonate 35, BUN 54,

creatinine 1.35, calcium 9, phosphorus 4.4, magnesium 2.2.

 

CURRENT MEDICATIONS: The patient's current medications were all reviewed by me.

Pertinent change in the medications include:

- Pradaxa has been stopped

 

ASSESSMENT: 66-year-old female with past medical history of morbid obesity,

chronic systolic and diastolic congestive heart failure, nephrology service is

following the patient for management of congestive heart failure (CHF) and fluid

overload.

 

PLAN:

1. Acute on chronic congestive heart failure and fluid overload. The patient is

making significant progress. She is having about 4-5 liters of urine output every

day. She is slowly losing weight. I decreased her diuretic dose yesterday because

she was having cramps so currently she is on Torsemide 40 mg by mouth twice a

day; metolazone 2.5 mg daily and spironolactone 25 mg by mouth twice a day.

Continue the current regimen at this time. Continue to check her daily weight and

24 hour intake and output.

 

2. Anemia in chronic kidney disease. The patient was having fecal occult positive

test. She was also on Pradaxa, Pradaxa has been stopped. She is status post two

units of packed red blood cells (PRBC) transfusion. Hemoglobin is acceptable at

this time.

 

3. Bilateral lower extremity edema. Her edema is significant getting better with

the current dose of diuretics.  Try to keep the legs elevated. She would also

benefit from compression dressings.

 

Plan of care was discussed with the hospitalist, Dr. Cameron Healy. [see HPI] : see HPI [Negative] : Heme/Lymph

## 2019-05-14 NOTE — PHYSICAL EXAM
[General Appearance - Well Developed] : well developed [Well Groomed] : well groomed [Normal Appearance] : normal appearance [General Appearance - Well Nourished] : well nourished [Abdomen Soft] : soft [Abdomen Tenderness] : non-tender [General Appearance - In No Acute Distress] : no acute distress [Costovertebral Angle Tenderness] : no ~M costovertebral angle tenderness [Urinary Bladder Findings] : the bladder was normal on palpation [Edema] : no peripheral edema [] : no respiratory distress [Respiration, Rhythm And Depth] : normal respiratory rhythm and effort [Exaggerated Use Of Accessory Muscles For Inspiration] : no accessory muscle use [Affect] : the affect was normal [Mood] : the mood was normal [Oriented To Time, Place, And Person] : oriented to person, place, and time [No Focal Deficits] : no focal deficits [Not Anxious] : not anxious [Normal Station and Gait] : the gait and station were normal for the patient's age [No Palpable Adenopathy] : no palpable adenopathy

## 2019-05-16 ENCOUNTER — RX RENEWAL (OUTPATIENT)
Age: 49
End: 2019-05-16

## 2019-05-17 ENCOUNTER — MOBILE ON CALL (OUTPATIENT)
Age: 49
End: 2019-05-17

## 2019-05-22 ENCOUNTER — RX RENEWAL (OUTPATIENT)
Age: 49
End: 2019-05-22

## 2019-05-31 ENCOUNTER — APPOINTMENT (OUTPATIENT)
Dept: CARDIOLOGY | Facility: CLINIC | Age: 49
End: 2019-05-31
Payer: COMMERCIAL

## 2019-05-31 PROCEDURE — 93923 UPR/LXTR ART STDY 3+ LVLS: CPT

## 2019-07-16 ENCOUNTER — APPOINTMENT (OUTPATIENT)
Dept: UROLOGY | Facility: CLINIC | Age: 49
End: 2019-07-16

## 2019-07-19 ENCOUNTER — APPOINTMENT (OUTPATIENT)
Dept: CARDIOLOGY | Facility: CLINIC | Age: 49
End: 2019-07-19
Payer: COMMERCIAL

## 2019-07-19 ENCOUNTER — NON-APPOINTMENT (OUTPATIENT)
Age: 49
End: 2019-07-19

## 2019-07-19 VITALS
WEIGHT: 260 LBS | DIASTOLIC BLOOD PRESSURE: 72 MMHG | BODY MASS INDEX: 33.37 KG/M2 | OXYGEN SATURATION: 99 % | HEART RATE: 88 BPM | SYSTOLIC BLOOD PRESSURE: 110 MMHG | HEIGHT: 74 IN

## 2019-07-19 PROCEDURE — 99214 OFFICE O/P EST MOD 30 MIN: CPT

## 2019-07-19 PROCEDURE — 93000 ELECTROCARDIOGRAM COMPLETE: CPT

## 2019-07-19 NOTE — REVIEW OF SYSTEMS
[see HPI] : see HPI [Negative] : Heme/Lymph [Shortness Of Breath] : no shortness of breath [Dyspnea on exertion] : not dyspnea during exertion [Chest Pain] : no chest pain [Lower Ext Edema] : no extremity edema

## 2019-07-19 NOTE — DISCUSSION/SUMMARY
[FreeTextEntry1] : He is a 49-year-old with Insulin requiring diabetes mellitus that is well controlled.\par His ECG is unremarkable.\par BP is optimal.\par We discussed weight loss as well.\par plan for visit with Dr. Madrigal and labs to be forwarded to me at that time.\par six month followup.

## 2019-07-19 NOTE — HISTORY OF PRESENT ILLNESS
[FreeTextEntry1] : now on insulin.\par Last A1c was 5.8\par Usual exertional capacity.\par Exercising  consistently.\par He underwent renal stone removal and restarted aspirin.

## 2019-07-22 ENCOUNTER — RX RENEWAL (OUTPATIENT)
Age: 49
End: 2019-07-22

## 2019-07-22 ENCOUNTER — INBOUND DOCUMENT (OUTPATIENT)
Age: 49
End: 2019-07-22

## 2019-08-22 ENCOUNTER — RX RENEWAL (OUTPATIENT)
Age: 49
End: 2019-08-22

## 2019-11-13 ENCOUNTER — APPOINTMENT (OUTPATIENT)
Dept: UROLOGY | Facility: CLINIC | Age: 49
End: 2019-11-13
Payer: COMMERCIAL

## 2019-11-13 VITALS
HEIGHT: 74 IN | HEART RATE: 93 BPM | WEIGHT: 260 LBS | BODY MASS INDEX: 33.37 KG/M2 | SYSTOLIC BLOOD PRESSURE: 115 MMHG | DIASTOLIC BLOOD PRESSURE: 81 MMHG

## 2019-11-13 DIAGNOSIS — N52.9 MALE ERECTILE DYSFUNCTION, UNSPECIFIED: ICD-10-CM

## 2019-11-13 DIAGNOSIS — E78.5 HYPERLIPIDEMIA, UNSPECIFIED: ICD-10-CM

## 2019-11-13 LAB
ALBUMIN SERPL ELPH-MCNC: 4.3 G/DL
ALP BLD-CCNC: 65 U/L
ALT SERPL-CCNC: 26 U/L
ANION GAP SERPL CALC-SCNC: 15 MMOL/L
AST SERPL-CCNC: 22 U/L
BILIRUB SERPL-MCNC: 1.1 MG/DL
BUN SERPL-MCNC: 16 MG/DL
CALCIUM SERPL-MCNC: 9.5 MG/DL
CALCIUM SERPL-MCNC: 9.5 MG/DL
CHLORIDE SERPL-SCNC: 99 MMOL/L
CO2 SERPL-SCNC: 23 MMOL/L
CREAT SERPL-MCNC: 0.87 MG/DL
GLUCOSE SERPL-MCNC: 189 MG/DL
PARATHYROID HORMONE INTACT: 19 PG/ML
POTASSIUM SERPL-SCNC: 4.7 MMOL/L
PROT SERPL-MCNC: 7.3 G/DL
SODIUM SERPL-SCNC: 137 MMOL/L
TSH SERPL-ACNC: 1.03 UIU/ML
URATE SERPL-MCNC: 6.9 MG/DL

## 2019-11-13 PROCEDURE — 99213 OFFICE O/P EST LOW 20 MIN: CPT

## 2019-11-13 NOTE — ASSESSMENT
[FreeTextEntry1] : Very pleasant 49-year-old gentleman who presents for followup of kidney stones\par -LithoLink urinalysis\par -CMP\par -PTH\par -TSH\par -Uric acid\par -F/U in 3-4 weeks

## 2019-11-13 NOTE — PHYSICAL EXAM
[General Appearance - Well Nourished] : well nourished [General Appearance - Well Developed] : well developed [Normal Appearance] : normal appearance [Well Groomed] : well groomed [General Appearance - In No Acute Distress] : no acute distress [Abdomen Soft] : soft [Abdomen Tenderness] : non-tender [Costovertebral Angle Tenderness] : no ~M costovertebral angle tenderness [Urinary Bladder Findings] : the bladder was normal on palpation [Edema] : no peripheral edema [Exaggerated Use Of Accessory Muscles For Inspiration] : no accessory muscle use [] : no respiratory distress [Respiration, Rhythm And Depth] : normal respiratory rhythm and effort [Affect] : the affect was normal [Oriented To Time, Place, And Person] : oriented to person, place, and time [Mood] : the mood was normal [Normal Station and Gait] : the gait and station were normal for the patient's age [Not Anxious] : not anxious [No Palpable Adenopathy] : no palpable adenopathy [No Focal Deficits] : no focal deficits

## 2019-11-13 NOTE — HISTORY OF PRESENT ILLNESS
[Urinary Urgency] : no urinary urgency [FreeTextEntry1] : Very pleasant 49-year-old gentleman who presents for followup of kidney stones. Patient feels well. He denies dysuria. No hematuria. No flank pain or suprapubic pain. Patient previously underwent ureteroscopy with stone extraction in May and he now presents for followup and for workup for kidney stones. He recently requested the LithoLink kit but has not done it yet. No other complaints.\par \par He also reports erectile dysfunction which is significantly improved with Viagra 100 mg. [Urinary Retention] : no urinary retention [Nocturia] : no nocturia [Straining] : no straining [Urinary Frequency] : no urinary frequency [Hematuria - Gross] : no gross hematuria [Erectile Dysfunction] : Erectile Dysfunction [Dysuria] : no dysuria [Bladder Spasm] : no bladder spasm [Flank Pain] : no flank pain [Abdominal Pain] : no abdominal pain [Fatigue] : no fatigue [Fever] : no fever [Nausea] : no nausea

## 2019-12-02 ENCOUNTER — APPOINTMENT (OUTPATIENT)
Dept: CARDIOLOGY | Facility: CLINIC | Age: 49
End: 2019-12-02
Payer: COMMERCIAL

## 2019-12-02 ENCOUNTER — NON-APPOINTMENT (OUTPATIENT)
Age: 49
End: 2019-12-02

## 2019-12-02 VITALS
SYSTOLIC BLOOD PRESSURE: 104 MMHG | HEART RATE: 91 BPM | DIASTOLIC BLOOD PRESSURE: 76 MMHG | WEIGHT: 263 LBS | BODY MASS INDEX: 33.77 KG/M2 | OXYGEN SATURATION: 99 %

## 2019-12-02 PROCEDURE — 93000 ELECTROCARDIOGRAM COMPLETE: CPT

## 2019-12-02 PROCEDURE — 99214 OFFICE O/P EST MOD 30 MIN: CPT

## 2019-12-02 NOTE — HISTORY OF PRESENT ILLNESS
[FreeTextEntry1] : now on insulin.\par Last A1c was 6.2 \par LDL was 140's. \par Exercising  consistently.

## 2019-12-02 NOTE — DISCUSSION/SUMMARY
[FreeTextEntry1] : He is a 49-year-old with Insulin requiring diabetes mellitus that is well controlled.\par His ECG is unremarkable.\par BP is optimal.\par LDL not at goal.\par Crestor 20. labs\par six month followup.

## 2019-12-31 ENCOUNTER — APPOINTMENT (OUTPATIENT)
Dept: UROLOGY | Facility: CLINIC | Age: 49
End: 2019-12-31

## 2020-01-02 ENCOUNTER — RX RENEWAL (OUTPATIENT)
Age: 50
End: 2020-01-02

## 2020-03-01 ENCOUNTER — RX RENEWAL (OUTPATIENT)
Age: 50
End: 2020-03-01

## 2020-06-08 ENCOUNTER — APPOINTMENT (OUTPATIENT)
Dept: CARDIOLOGY | Facility: CLINIC | Age: 50
End: 2020-06-08

## 2020-06-24 ENCOUNTER — RX RENEWAL (OUTPATIENT)
Age: 50
End: 2020-06-24

## 2020-08-24 ENCOUNTER — RX RENEWAL (OUTPATIENT)
Age: 50
End: 2020-08-24

## 2020-09-11 ENCOUNTER — RX RENEWAL (OUTPATIENT)
Age: 50
End: 2020-09-11

## 2020-12-21 ENCOUNTER — RX RENEWAL (OUTPATIENT)
Age: 50
End: 2020-12-21

## 2020-12-21 PROBLEM — N39.0 ACUTE UTI: Status: RESOLVED | Noted: 2019-03-19 | Resolved: 2020-12-21

## 2021-04-18 ENCOUNTER — RX RENEWAL (OUTPATIENT)
Age: 51
End: 2021-04-18

## 2021-09-01 NOTE — H&P PST ADULT - VENOUS THROMBOEMBOLISM AGE
Vascular Surgery Follow up      CHIEF COMPLAINT: s/p right carotid stent 2/2020    HISTORY OF PRESENT ILLNESS: Anika Tinsley is a 63 year old female who presents to the clinic today by herself. She has a history of CAD s/p CABG, HTN, lumbar stenosis and type II DM. Pt had a right carotid stent placed in 2/2020 after she presented to the hospital with slurring of speech and left facial droop. At the time, she had a CT scan of head and neck that showed right 60-70% stenosis of the right carotid artery.  She did not have any residual deficit.     Since stent placement, patient had 1 month follow up ultrasound that showed patient stent without restenosis, right ICA had less than 50% occlusion. She has not followed up with the vascular clinic since 3/2020. She does not have a recent ultrasound done. She did finish 3 months of plavix. She has been taking daily asa and atorvastatin. She denies any neurological deficits, TIAs or strokes. She denies tobacco history.       PROBLEM LIST:    Patient Active Problem List   Diagnosis   • Type 2 diabetes mellitus with diabetic polyneuropathy, with long-term current use of insulin (CMS/[facility])   • GERD (gastroesophageal reflux disease)   • Gastritis   • Diverticulosis of colon (without mention of hemorrhage)   • Asthma   • Morbid obesity with BMI of 40.0-44.9, adult (CMS/[facility])   • IBS (irritable bowel syndrome)   • Depression   • Tension headache   • Pulmonary nodule   • NAFL (nonalcoholic fatty liver)   • Degenerative joint disease (DJD) of lumbar spine   • S/P CABG (coronary artery bypass graft)   • RLS (restless legs syndrome)   • Essential hypertension   • Shortness of breath   • Hyperlipidemia   • ASHD (arteriosclerotic heart disease)   • Hyperlipidemia associated with type 2 diabetes mellitus (CMS/HCC)   • Polyarthralgia   • Age-related osteoporosis without current pathological fracture   • GOA (generalized osteoarthritis)   • Family history of osteoarthritis   • Class 3 severe  obesity due to excess calories with serious comorbidity and body mass index (BMI) of 40.0 to 44.9 in adult (CMS/HCC)   • Major depressive disorder, recurrent episode, mild (CMS/HCC)   • DDD (degenerative disc disease), cervical   • Spondylosis of lumbar region without myelopathy or radiculopathy   • Seborrheic keratoses   • Skin tag   • Facial droop   • Chest pain   • Cerebral microvascular disease   • Vertigo   • CHANDRAKANT (obstructive sleep apnea)   • Stenosis of right carotid artery   • History of TIA (transient ischemic attack)   • Anemia   • Mild intermittent asthma without complication        PAST MEDICAL HISTORY:    Past Medical History:   Diagnosis Date   • Arthritis    • Asthma    • Bilateral carpal tunnel syndrome    • Colon polyp    • COPD (chronic obstructive pulmonary disease) (CMS/[facility])     is around smokers, and used to smoke   • Coronary artery disease 13    4 vessel heart disease   • Depression    • Diabetes mellitus type II     oral medication, checks blood sugars   • Diverticulosis     mild   • Esophageal reflux    • Fatty liver    • Hammer toe    • Hepatomegaly    • Herniated disc, cervical    • Herniated vertebral disc     lower back   • HLD (hyperlipidemia)    • HTN (hypertension)    • Hyponatremia 2017   • IBS (irritable bowel syndrome)     iritable bowel with diarrhea, nausea   • Left elbow pain    • Neuropathy    • Obesity    • Occasional numbness/prickling/tingling of fingers and toes     bilat hands   • Other chronic pain     hamstring injury-buttock pain   • Personal history of traumatic fracture     fell out of deer stand and 10 rib fractures posteriorly   • Seasonal allergies    • Shoulder pain, bilateral     R>L        PAST SURGICAL HISTORY:    Past Surgical History:   Procedure Laterality Date   • Cabg, arterial, four+  2014   • Carpal tunnel release Bilateral     bilateral   •  delivery only  1979   •  delivery only  1980   • Cholecystectomy   01/01/1980   • Colonoscopy  10/30/2017    Repeat in 3 years   • Colonoscopy  10/15/2020   • Colonoscopy w/ biopsies  08/31/2012   • Colonoscopy w/ biopsies  10/2/2013     performed at [facility].   • Coronary angiogram - cv  12/20/13    with Dr. Horne   • Cyst removal Right     foot   • Esophagogastroduodenoscopy  11/04/2013   • Esophagogastroduodenoscopy  05/17/2018   • Flexible sigmoidoscopy bx  07/09/2013   • Hysterectomy     • Ir spine injection      x2 to lumbar spine   • Medial branch block Bilateral    • Medial branch block     • Radiofrequency ablation     • Repair of thomas,one Left 11/26/2008   • Stress test  11/2013       CURRENT MEDICATIONS:   Current Outpatient Medications   Medication   • glipiZIDE (GLUCOTROL) 5 MG tablet   • ergocalciferol (DRISDOL) 1.25 mg (50,000 units) capsule   • pramipexole (MIRAPEX) 0.25 MG tablet   • insulin regular human, CONCENTRATED, (HUMULIN R) 500 UNIT/ML injectable solution   • empagliflozin (Jardiance) 25 MG tablet   • Semaglutide, 1 MG/DOSE, (Ozempic, 1 MG/DOSE,) 4 MG/3ML Solution Pen-injector   • Glucagon (Baqsimi Two Pack) 3 MG/DOSE Powder   • HYDROcodone-acetaminophen (NORCO) 5-325 MG per tablet   • pregabalin (LYRICA) 150 MG capsule   • HYDROcodone-acetaminophen (NORCO) 5-325 MG per tablet   • amitriptyline (ELAVIL) 100 MG tablet   • DISPENSE   • triamcinolone (ARISTOCORT) 0.1 % ointment   • Continuous Blood Gluc Sensor (FreeStyle Roosevelt 14 Day Sensor) Misc   • atorvastatin (LIPITOR) 80 MG tablet   • losartan (COZAAR) 25 MG tablet   • metoPROLOL tartrate (Lopressor) 50 MG tablet   • tiZANidine (ZANAFLEX) 2 MG tablet   • sucralfate (CARAFATE) 1 g tablet   • pantoprazole (PROTONIX) 40 MG tablet   • hydroxychloroquine (PLAQUENIL) 200 MG tablet   • lidocaine (XYLOCAINE) 2 % jelly   • aspirin 81 MG chewable tablet   • docusate sodium (STOOL SOFTENER) 100 MG capsule   • DISPENSE   • alum hydroxide-mag trisilicate (GAVISCON) 80-20 MG Chew Tab   • fluticasone  (FLONASE) 50 MCG/ACT nasal spray   • albuterol 108 (90 Base) MCG/ACT inhaler   • CALCIUM PO   • blood glucose test strip   • polyethylene glycol (MIRALAX) powder   • fluticasone-salmeterol (ADVAIR DISKUS) 250-50 MCG/DOSE inhaler   • acetaminophen (TYLENOL) 500 MG tablet   • Multiple Vitamins-Minerals (MULTIVITAMIN PO)   • Probiotic Product (ALIGN) 4 MG CAPS   • Glucagon, rDNA, (Glucagon Emergency) 1 MG Kit   • glucagon 1 MG injection kit   • nitroGLYcerin (NITROSTAT) 0.4 MG sublingual tablet     No current facility-administered medications for this visit.       HOME MEDICATIONS:  Prior to Admission medications    Medication Sig Start Date End Date Taking? Authorizing Provider   glipiZIDE (GLUCOTROL) 5 MG tablet Take 2 tablets by mouth 2 times daily (before meals). 8/30/21  Yes Yulissa RASMUSSEN PA-C   ergocalciferol (DRISDOL) 1.25 mg (50,000 units) capsule Take 1 capsule by mouth 2 days a week. Will call when she needs it. 8/30/21  Yes Georgina Kitchen NP   pramipexole (MIRAPEX) 0.25 MG tablet Take 1 tablet by mouth at bedtime as needed (restless legs). 8/9/21  Yes Daniela Caputo MD   insulin regular human, CONCENTRATED, (HUMULIN R) 500 UNIT/ML injectable solution 135 units am and 95 units pm before meals 7/19/21  Yes Yulissa RASMUSSEN PA-C   empagliflozin (Jardiance) 25 MG tablet Take 1 tablet by mouth daily (before breakfast). 7/19/21  Yes Yulissa RASMUSSEN PA-C   Semaglutide, 1 MG/DOSE, (Ozempic, 1 MG/DOSE,) 4 MG/3ML Solution Pen-injector Inject 1 mg into the skin 1 day a week. 7/19/21  Yes Yulissa RASMUSSEN PA-C   Glucagon (Baqsimi Two Pack) 3 MG/DOSE Powder Call 911.  Place 1 spray in 1 nostril.  If no response in 15 minutes, place 1 more spray in nostril with new device. 7/19/21  Yes Yulissa RASMUSSEN PA-C   HYDROcodone-acetaminophen (NORCO) 5-325 MG per tablet Take 1 tablet by mouth every 6 hours as needed for Pain. 6/30/21  Yes Lian June MD   pregabalin (LYRICA) 150 MG capsule Take 1 capsule by mouth 3 times  daily. 6/10/21 9/2/21 Yes Lian June MD   HYDROcodone-acetaminophen (NORCO) 5-325 MG per tablet Take 1 tablet by mouth every 6 hours as needed for Pain. 3/30/21  Yes CATHLEEN Dos Santos   amitriptyline (ELAVIL) 100 MG tablet Take 1 tablet by mouth daily. 3/24/21  Yes Daniela Caputo MD   DISPENSE Diabetic shoes 3/23/21  Yes Daniela Caputo MD   triamcinolone (ARISTOCORT) 0.1 % ointment Apply topically 2 times daily as needed (3 weeks on, 1 week off). 1/19/21  Yes Guido Yu MD   Continuous Blood Gluc Sensor (FreeStyle Roosevelt 14 Day Sensor) Misc Change sensor every 14 days 1/11/21  Yes Yulissa RASMUSSEN PA-C   atorvastatin (LIPITOR) 80 MG tablet Take 1 tablet by mouth daily. 12/17/20  Yes Demetria Case NP   losartan (COZAAR) 25 MG tablet Take 1 tablet by mouth daily. 12/17/20  Yes Demetria Case NP   metoPROLOL tartrate (Lopressor) 50 MG tablet Take 1 tablet by mouth two times daily. 12/17/20  Yes Demetria Case NP   tiZANidine (ZANAFLEX) 2 MG tablet Take 1-2 tablets by mouth 3 times daily as needed for Muscle spasms. 10/14/20  Yes Lian June MD   sucralfate (CARAFATE) 1 g tablet Take 1 tablet by mouth 4 times daily 1 hour before meals and at bedtime. 9/22/20  Yes CATHLEEN Ybarra   pantoprazole (PROTONIX) 40 MG tablet Take 1 tablet by mouth 2 times daily (before meals). 9/16/20  Yes CATHLEEN Ybarra   hydroxychloroquine (PLAQUENIL) 200 MG tablet Take 1 tablet by mouth 2 times daily. 9/7/20  Yes Georgina Kitchen NP   lidocaine (XYLOCAINE) 2 % jelly apply to affected area twice daily as needed. 6/30/20  Yes Oc Arreola DO   aspirin 81 MG chewable tablet Chew 2 tablets by mouth daily. 6/4/20  Yes Daniela Caputo MD   docusate sodium (STOOL SOFTENER) 100 MG capsule Take 2 capsules by mouth 2 times daily. 6/4/20  Yes Daniela Caputo MD   DISPENSE CPAP at bedtime 4/8/20  Yes Jermain Curtis MD   alum hydroxide-mag trisilicate (GAVISCON) 80-20 MG Chew Tab Chew 2 tablets by mouth 4  times daily as needed (Acid Reflux).   Yes Outside Provider   fluticasone (FLONASE) 50 MCG/ACT nasal spray Spray 1 spray in each nostril daily as needed (Nasal Signs and Symptoms).   Yes Outside Provider   albuterol 108 (90 Base) MCG/ACT inhaler Inhale 2 puffs into the lungs every 4 hours as needed (shortness of breath).   Yes Outside Provider   CALCIUM PO Take 2 tablets by mouth daily.   Yes Outside Provider   blood glucose test strip Test blood sugar 4 times daily as directed.  Dx. Code-E11.42. 6/28/19  Yes Yulissa RASMUSSEN PA-C   polyethylene glycol (MIRALAX) powder Take 17 g by mouth 2 times daily as needed (constipation). 3/27/19  Yes CATHLEEN Ybarra   fluticasone-salmeterol (ADVAIR DISKUS) 250-50 MCG/DOSE inhaler Inhale 1 puff 2 times daily 12/21/17  Yes    acetaminophen (TYLENOL) 500 MG tablet Take 1,000 mg by mouth every 6 hours as needed for Pain.    Yes Outside Provider   Multiple Vitamins-Minerals (MULTIVITAMIN PO) Take 1 tablet by mouth daily.   Yes Outside Provider   Probiotic Product (ALIGN) 4 MG CAPS Take 1 capsule by mouth daily. 11/13/13  Yes Nadeem Alexander MD   Glucagon, rDNA, (Glucagon Emergency) 1 MG Kit Inject 1 mg into the muscle 1 time for 1 dose. 9/24/20 12/31/20  Yulissa RASMUSSEN PA-C   glucagon 1 MG injection kit Inject 1 mg into the muscle 1 time for 1 dose. 9/24/20 9/24/20  Yulissa RASMUSSEN PA-C   nitroGLYcerin (NITROSTAT) 0.4 MG sublingual tablet PLACE 1 TABLET (0.4 MG) UNDER THE TONGUE EVERY 5 MINUTES AS NEEDED FOR CHEST PAIN. DO NOT EXCEED 3 DOSES IN 15 MINUTES. 12/31/19   Daniela Caputo MD         ALLERGIES:  ALLERGIES:   Allergen Reactions   • Peanut   (Food Or Med) ANAPHYLAXIS and RASH   • Peanut - Dietary Use Only RASH and ANAPHYLAXIS   • Levaquin      Heart raced, restless    • Menthol RASH and SWELLING   • Menthol [Ice Blue] RASH and SWELLING   • Metformin GI UPSET   • Valacyclovir Other (See Comments)     Stomach pains    • Seasonal      congestion        SOCIAL  HISTORY:   Social History     Tobacco Use   • Smoking status: Never Smoker   • Smokeless tobacco: Never Used   • Tobacco comment: NECK:  52  CM   Substance Use Topics   • Alcohol use: No     Alcohol/week: 0.0 standard drinks       FAMILY HISTORY:   Family History   Problem Relation Age of Onset   • Diabetes Mother 35        retinopathy, foot amputations    • Heart disease Mother         cabg   • Hypertension Mother    • High blood pressure Mother    • Kidney disease Mother    • Cataracts Mother    • Hyperlipidemia Mother    • Migraine Mother    • Cancer, Prostate Father    • Cancer Father         head and neck   • Cancer, Lung Father    • Cancer, Colon Father    • Arthritis Father    • Heart disease Sister    • Diabetes Sister    • High blood pressure Sister    • Heart disease Paternal Grandmother    • Diabetes Paternal Grandmother    • Diabetes Other         maternal grandparent   • Stroke Other         maternal grandparent   • Celiac Disease Other         F/H   • Depression Sister    • Heart disease Sister    • Migraine Daughter        PHYSICAL EXAM:  Visit Vitals  /62 (BP Location: RUE - Right upper extremity, Patient Position: Sitting, Cuff Size: Regular)   Pulse 76   Ht 5' 3\" (1.6 m) Comment: patient stated   Wt 102.1 kg   SpO2 98%   BMI 39.86 kg/m²     Constitutional:  Well developed, well nourished, no acute distress, nontoxic appearance, able to get onto the examining table without assistance   Neck:  No mass, no bruit  Lungs:  No respiratory distress, normal breath sounds, no rales, no wheezing   Heart:  Normal rate, normal rhythm, no murmur  Abdomen:  Soft, nondistended, normal bowel sounds, nontender, no organomegaly, no mass, no rebound, no guarding   Extremities:  No edema, no tenderness, no deformities, palpable pulses, no sores or ulcers  Neurology Cn 2-12 intact, UE/LE power 5/5 equal and bilateral    Review of Systems:  Constitutional:  Denies fever or chills.  Weight stable.   Vascular denies  claudication, rest pain or tissue loss    IMPRESSION:  Anika Tinsley is a 63 year old female s/p right carotid stent    PLAN:   Overall, patient is doing well. She denies TIAs or stroke like symptoms. Will order ultrasound of right carotid to check patency. Will call patient with results or follow up if further images are needed. Encouraged continuing asa daily. Will follow up again in 6 months.        (1) 41-60 years

## 2021-09-21 ENCOUNTER — RX RENEWAL (OUTPATIENT)
Age: 51
End: 2021-09-21

## 2021-12-18 ENCOUNTER — RX RENEWAL (OUTPATIENT)
Age: 51
End: 2021-12-18

## 2022-04-19 NOTE — BRIEF OPERATIVE NOTE - TYPE OF ANESTHESIA
General Banner Transposition Flap Text: The defect edges were debeveled with a #15 scalpel blade.  Given the location of the defect and the proximity to free margins a Banner transposition flap was deemed most appropriate.  Using a sterile surgical marker, an appropriate flap drawn around the defect. The area thus outlined was incised deep to adipose tissue with a #15 scalpel blade.  The skin margins were undermined to an appropriate distance in all directions utilizing iris scissors.

## 2022-04-22 ENCOUNTER — APPOINTMENT (OUTPATIENT)
Dept: INTERNAL MEDICINE | Facility: CLINIC | Age: 52
End: 2022-04-22
Payer: COMMERCIAL

## 2022-04-22 VITALS
WEIGHT: 270 LBS | TEMPERATURE: 97.16 F | BODY MASS INDEX: 36.57 KG/M2 | HEART RATE: 91 BPM | HEIGHT: 72 IN | OXYGEN SATURATION: 98 %

## 2022-04-22 DIAGNOSIS — Z01.810 ENCOUNTER FOR PREPROCEDURAL CARDIOVASCULAR EXAMINATION: ICD-10-CM

## 2022-04-22 DIAGNOSIS — Z92.29 PERSONAL HISTORY OF OTHER DRUG THERAPY: ICD-10-CM

## 2022-04-22 PROCEDURE — 99386 PREV VISIT NEW AGE 40-64: CPT | Mod: 25

## 2022-04-22 PROCEDURE — 36415 COLL VENOUS BLD VENIPUNCTURE: CPT

## 2022-04-22 PROCEDURE — 82270 OCCULT BLOOD FECES: CPT

## 2022-04-22 RX ORDER — OXYCODONE AND ACETAMINOPHEN 5; 325 MG/1; MG/1
5-325 TABLET ORAL
Qty: 10 | Refills: 0 | Status: DISCONTINUED | COMMUNITY
Start: 2019-03-14 | End: 2022-04-22

## 2022-04-22 RX ORDER — SILVER SULFADIAZINE 10 MG/G
1 CREAM TOPICAL
Qty: 50 | Refills: 0 | Status: DISCONTINUED | COMMUNITY
Start: 2019-01-30 | End: 2022-04-22

## 2022-04-22 RX ORDER — SEMAGLUTIDE 1.34 MG/ML
2 INJECTION, SOLUTION SUBCUTANEOUS
Qty: 9 | Refills: 0 | Status: DISCONTINUED | COMMUNITY
Start: 2019-02-12 | End: 2022-04-22

## 2022-04-22 RX ORDER — FLASH GLUCOSE SCANNING READER
EACH MISCELLANEOUS
Qty: 1 | Refills: 0 | Status: DISCONTINUED | COMMUNITY
Start: 2018-10-23 | End: 2022-04-22

## 2022-04-25 VITALS — DIASTOLIC BLOOD PRESSURE: 72 MMHG | SYSTOLIC BLOOD PRESSURE: 122 MMHG

## 2022-04-25 NOTE — HEALTH RISK ASSESSMENT
[Never] : Never [No] : No [No falls in past year] : Patient reported no falls in the past year [0] : 2) Feeling down, depressed, or hopeless: Not at all (0) [Fully functional (bathing, dressing, toileting, transferring, walking, feeding)] : Fully functional (bathing, dressing, toileting, transferring, walking, feeding) [Fully functional (using the telephone, shopping, preparing meals, housekeeping, doing laundry, using] : Fully functional and needs no help or supervision to perform IADLs (using the telephone, shopping, preparing meals, housekeeping, doing laundry, using transportation, managing medications and managing finances) [[id number]] : 0 [Reports changes in hearing] : Reports no changes in hearing [Reports changes in vision] : Reports no changes in vision [Reports changes in dental health] : Reports no changes in dental health

## 2022-04-25 NOTE — PHYSICAL EXAM
[No Acute Distress] : no acute distress [Well Nourished] : well nourished [Well Developed] : well developed [Well-Appearing] : well-appearing [Normal Sclera/Conjunctiva] : normal sclera/conjunctiva [PERRL] : pupils equal round and reactive to light [EOMI] : extraocular movements intact [Normal Outer Ear/Nose] : the outer ears and nose were normal in appearance [Normal Oropharynx] : the oropharynx was normal [No JVD] : no jugular venous distention [No Lymphadenopathy] : no lymphadenopathy [Supple] : supple [Thyroid Normal, No Nodules] : the thyroid was normal and there were no nodules present [No Respiratory Distress] : no respiratory distress  [No Accessory Muscle Use] : no accessory muscle use [Clear to Auscultation] : lungs were clear to auscultation bilaterally [Normal Rate] : normal rate  [Regular Rhythm] : with a regular rhythm [Normal S1, S2] : normal S1 and S2 [No Murmur] : no murmur heard [No Carotid Bruits] : no carotid bruits [No Abdominal Bruit] : a ~M bruit was not heard ~T in the abdomen [No Varicosities] : no varicosities [Pedal Pulses Present] : the pedal pulses are present [No Edema] : there was no peripheral edema [No Palpable Aorta] : no palpable aorta [Soft] : abdomen soft [No Extremity Clubbing/Cyanosis] : no extremity clubbing/cyanosis [Non Tender] : non-tender [Non-distended] : non-distended [No Masses] : no abdominal mass palpated [No HSM] : no HSM [Normal Bowel Sounds] : normal bowel sounds [Normal Sphincter Tone] : normal sphincter tone [No Mass] : no mass [Normal Posterior Cervical Nodes] : no posterior cervical lymphadenopathy [Normal Anterior Cervical Nodes] : no anterior cervical lymphadenopathy [No CVA Tenderness] : no CVA  tenderness [No Spinal Tenderness] : no spinal tenderness [No Joint Swelling] : no joint swelling [Grossly Normal Strength/Tone] : grossly normal strength/tone [Coordination Grossly Intact] : coordination grossly intact [No Rash] : no rash [No Focal Deficits] : no focal deficits [Normal Gait] : normal gait [Deep Tendon Reflexes (DTR)] : deep tendon reflexes were 2+ and symmetric [Normal Affect] : the affect was normal [Normal Insight/Judgement] : insight and judgment were intact [Stool Occult Blood] : stool negative for occult blood [de-identified] : obese [de-identified] : 2+ pulses lower extremity

## 2022-04-25 NOTE — ASSESSMENT
[FreeTextEntry1] : His medical history was reviewed.  He hasn't been seen here in several years and regular follow-up was advised.  Discussed diet, exercise, weight loss.  He says he has been diagnosed with type 1 DM.  he sees Dr. Matson and he says a recent HgBA1c was good at 6.2.  He was advised to follow-up there regularly.  \par \par He says he sees his cardiologist every six months although it doesn't appear that he has been there since 2019.  He does have an appointment coming up.  Check lipids on Rosuvastatin.  The BP is good.\par \par He sees an ophthalmologist and podiatrist. \par \par S/p COVID-19 vaccine and two boosters.\par \par He reports that he had a colonoscopy about three years ago.  He should have the report sent.  \par \par Check a PSA and other routine blood tests.  \par \par He sees a urologist PRN.

## 2022-04-25 NOTE — HISTORY OF PRESENT ILLNESS
[FreeTextEntry1] : The patient is here for a routine visit. He hasn't been seen here in several years. [de-identified] : He says he has now been diagnosed with type 1 diabetes.  He sees Dr. Hui Matson and he says he goes there every three months. He reports that a recent HgBA1c was 6.2.  His diet has been better and he does some exercise.  \par \par He had a foot wound in 2019 that has healed.\par \par He has had nephrolithiasis.  No symptoms now.\par \par He is working in [place] although he works remotely the majority of the time.

## 2022-05-13 ENCOUNTER — APPOINTMENT (OUTPATIENT)
Dept: CARDIOLOGY | Facility: CLINIC | Age: 52
End: 2022-05-13
Payer: COMMERCIAL

## 2022-05-13 ENCOUNTER — NON-APPOINTMENT (OUTPATIENT)
Age: 52
End: 2022-05-13

## 2022-05-13 VITALS
HEIGHT: 72 IN | DIASTOLIC BLOOD PRESSURE: 80 MMHG | HEART RATE: 88 BPM | BODY MASS INDEX: 36.57 KG/M2 | OXYGEN SATURATION: 97 % | SYSTOLIC BLOOD PRESSURE: 120 MMHG | WEIGHT: 270 LBS

## 2022-05-13 DIAGNOSIS — R06.00 DYSPNEA, UNSPECIFIED: ICD-10-CM

## 2022-05-13 DIAGNOSIS — I05.9 RHEUMATIC MITRAL VALVE DISEASE, UNSPECIFIED: ICD-10-CM

## 2022-05-13 PROCEDURE — 93000 ELECTROCARDIOGRAM COMPLETE: CPT

## 2022-05-13 PROCEDURE — 99214 OFFICE O/P EST MOD 30 MIN: CPT

## 2022-05-13 NOTE — HISTORY OF PRESENT ILLNESS
[FreeTextEntry1] : He has been feeling well. \par Diagnosed with type 1 DM. Doing well on insulin. Last A1c was 6.4 \par LDL was 61. \par Stationary bike in the office. No chest pain or dyspnea.\par Recent hiking noted some dyspnea. No chest pain.\par Renal stones have been an issue.

## 2022-05-13 NOTE — DISCUSSION/SUMMARY
[FreeTextEntry1] : He is a 51-year-old with Insulin requiring diabetes mellitus, hyperlipidemia/triglyceridemia with some HENDRICKSON.\par BP is optimal. Continue valsartan. that is well controlled.\par His ECG is unremarkable.\par  \par LDL is at goal. Crestor 20. labs\par Echo to monitor for sources of dyspnea.\par Stress echo to monitor for ischemic heart disease.\par

## 2022-06-05 ENCOUNTER — APPOINTMENT (OUTPATIENT)
Dept: CARDIOLOGY | Facility: CLINIC | Age: 52
End: 2022-06-05
Payer: COMMERCIAL

## 2022-06-05 PROCEDURE — 93306 TTE W/DOPPLER COMPLETE: CPT

## 2022-06-17 ENCOUNTER — APPOINTMENT (OUTPATIENT)
Dept: CARDIOLOGY | Facility: CLINIC | Age: 52
End: 2022-06-17

## 2022-08-07 ENCOUNTER — RX RENEWAL (OUTPATIENT)
Age: 52
End: 2022-08-07

## 2022-08-15 ENCOUNTER — RX RENEWAL (OUTPATIENT)
Age: 52
End: 2022-08-15

## 2022-09-17 LAB
25(OH)D3 SERPL-MCNC: 28.4 NG/ML
ALBUMIN SERPL ELPH-MCNC: 4.7 G/DL
ALP BLD-CCNC: 52 U/L
ALT SERPL-CCNC: 28 U/L
ANION GAP SERPL CALC-SCNC: 15 MMOL/L
APPEARANCE: CLEAR
AST SERPL-CCNC: 31 U/L
BACTERIA: NEGATIVE
BASOPHILS # BLD AUTO: 0.04 K/UL
BASOPHILS NFR BLD AUTO: 0.5 %
BILIRUB SERPL-MCNC: 0.8 MG/DL
BILIRUBIN URINE: NEGATIVE
BLOOD URINE: NEGATIVE
BUN SERPL-MCNC: 16 MG/DL
CALCIUM SERPL-MCNC: 10.1 MG/DL
CHLORIDE SERPL-SCNC: 99 MMOL/L
CHOLEST SERPL-MCNC: 123 MG/DL
CO2 SERPL-SCNC: 25 MMOL/L
COLOR: YELLOW
CREAT SERPL-MCNC: 0.78 MG/DL
EGFR: 108 ML/MIN/1.73M2
EOSINOPHIL # BLD AUTO: 0.29 K/UL
EOSINOPHIL NFR BLD AUTO: 3.8 %
GLUCOSE QUALITATIVE U: NEGATIVE
GLUCOSE SERPL-MCNC: 87 MG/DL
HCT VFR BLD CALC: 42 %
HDLC SERPL-MCNC: 28 MG/DL
HGB BLD-MCNC: 14.4 G/DL
HYALINE CASTS: 0 /LPF
IMM GRANULOCYTES NFR BLD AUTO: 0.4 %
KETONES URINE: NEGATIVE
LDLC SERPL CALC-MCNC: 61 MG/DL
LEUKOCYTE ESTERASE URINE: NEGATIVE
LYMPHOCYTES # BLD AUTO: 2.22 K/UL
LYMPHOCYTES NFR BLD AUTO: 28.9 %
MAN DIFF?: NORMAL
MCHC RBC-ENTMCNC: 33.6 PG
MCHC RBC-ENTMCNC: 34.3 GM/DL
MCV RBC AUTO: 98.1 FL
MICROSCOPIC-UA: NORMAL
MONOCYTES # BLD AUTO: 0.53 K/UL
MONOCYTES NFR BLD AUTO: 6.9 %
NEUTROPHILS # BLD AUTO: 4.57 K/UL
NEUTROPHILS NFR BLD AUTO: 59.5 %
NITRITE URINE: NEGATIVE
NONHDLC SERPL-MCNC: 95 MG/DL
PH URINE: 6.5
PLATELET # BLD AUTO: 133 K/UL
POTASSIUM SERPL-SCNC: 4.5 MMOL/L
PROT SERPL-MCNC: 7.5 G/DL
PROTEIN URINE: NORMAL
PSA SERPL-MCNC: 0.11 NG/ML
RBC # BLD: 4.28 M/UL
RBC # FLD: 13.2 %
RED BLOOD CELLS URINE: 6 /HPF
SODIUM SERPL-SCNC: 139 MMOL/L
SPECIFIC GRAVITY URINE: 1.03
SQUAMOUS EPITHELIAL CELLS: 0 /HPF
T4 FREE SERPL-MCNC: 1.6 NG/DL
TRIGL SERPL-MCNC: 172 MG/DL
TSH SERPL-ACNC: 1.18 UIU/ML
UROBILINOGEN URINE: ABNORMAL
VIT B12 SERPL-MCNC: 981 PG/ML
WBC # FLD AUTO: 7.68 K/UL
WHITE BLOOD CELLS URINE: 1 /HPF

## 2023-04-05 ENCOUNTER — RX RENEWAL (OUTPATIENT)
Age: 53
End: 2023-04-05

## 2023-06-19 NOTE — H&P PST ADULT - NS PRO AD PATIENT TYPE
Health Care Proxy (HCP)/Do Not Resuscitate (DNR) Detail Level: Detailed General Sunscreen Counseling: I recommended a broad spectrum sunscreen with a SPF of 30 or higher. I explained that SPF 30 sunscreens block approximately 97 percent of the sun's harmful rays. Sunscreens should be applied at least 15 minutes prior to expected sun exposure and then every 2 hours after that as long as sun exposure continues. If swimming or exercising sunscreen should be reapplied every 45 minutes to an hour after getting wet or sweating. One ounce, or the equivalent of a shot glass full of sunscreen, is adequate to protect the skin not covered by a bathing suit. I also recommended a lip balm with a sunscreen as well. Sun protective clothing can be used in lieu of sunscreen but must be worn the entire time you are exposed to the sun's rays. Products Recommended: Cerave

## 2023-07-06 ENCOUNTER — RX RENEWAL (OUTPATIENT)
Age: 53
End: 2023-07-06

## 2023-09-06 NOTE — ED PROVIDER NOTE - DATE/TIME 3
13-Mar-2019 00:10 Ketoconazole Counseling:   Patient counseled regarding improving absorption with orange juice.  Adverse effects include but are not limited to breast enlargement, headache, diarrhea, nausea, upset stomach, liver function test abnormalities, taste disturbance, and stomach pain.  There is a rare possibility of liver failure that can occur when taking ketoconazole. The patient understands that monitoring of LFTs may be required, especially at baseline. The patient verbalized understanding of the proper use and possible adverse effects of ketoconazole.  All of the patient's questions and concerns were addressed.

## 2023-09-19 NOTE — PROVIDER CONTACT NOTE (OTHER) - NAME OF MD/NP/PA/DO NOTIFIED:
History  Chief Complaint   Patient presents with   • Medical Problem     Wants to get checked to see if she has cancer. C/o swollen stomach, face, and legs     HPI   Patient is a 51-year-old female with past medical history hypothyroidism, myxedema coma, who presents to the ED for evaluation of "swelling" of the face, stomach, arms, legs, and skin changes over the past several months. Patient's mother at bedside is worried that patient has an unknown form of cancer. No family history of cancer. Patient states she has been following up outpatient for her hypothyroidism. Patient denies any complaints or concerns at this time. Prior to Admission Medications   Prescriptions Last Dose Informant Patient Reported?  Taking?   atorvastatin (LIPITOR) 80 mg tablet  Self, Child, Mother No No   Sig: Take 1 tablet (80 mg total) by mouth daily with dinner   divalproex sodium (DEPAKOTE ER) 250 mg 24 hr tablet   No No   Sig: Take 3 tablets (750 mg total) by mouth every morning   folic acid (FOLVITE) 1 mg tablet  Self, Child, Mother No No   Sig: Take 1 tablet (1 mg total) by mouth daily Do not start before February 7, 2023.   hydrocortisone 1 % cream  Mother No No   Sig: Apply to affected area 2 times daily   ketoconazole (NIZORAL) 2 % shampoo  Mother Yes No   lacosamide (VIMPAT) 150 mg tablet   No No   Sig: Take 1 tablet (150 mg total) by mouth every 12 (twelve) hours   lacosamide (Vimpat) 50 mg   No No   Sig: Take 1 tablet (50 mg total) by mouth daily at bedtime   levothyroxine 150 mcg tablet   No No   Sig: Take 1 tablet (150 mcg total) by mouth daily in the early morning for 21 days   metoprolol tartrate (LOPRESSOR) 25 mg tablet  Self, Child, Mother No No   Sig: Take 1 tablet (25 mg total) by mouth every 12 (twelve) hours   niacin (SLO-NIACIN) 500 mg tablet  Self, Child, Mother No No   Sig: Take 2 tablets (1,000 mg total) by mouth 2 (two) times a day with meals   oxyCODONE (ROXICODONE) 5 immediate release tablet   No No BRIAN Skinner Sig: Take 1 tablet (5 mg total) by mouth every 6 (six) hours as needed for severe pain for up to 15 doses Max Daily Amount: 20 mg   paliperidone (INVEGA) 3 mg 24 hr tablet  Mother No No   Sig: Take 1 tablet (3 mg total) by mouth every morning      Facility-Administered Medications: None       Past Medical History:   Diagnosis Date   • Cancer ([address])     thyroid cancer   • Disease of thyroid gland    • Hyperactivity (behavior)     Last Assessed: 11/7/2014    • Hyperlipidemia    • Obesity    • Seizures ([address])        Past Surgical History:   Procedure Laterality Date   • OTHER SURGICAL HISTORY      Removal of urinary calculus    • OTHER SURGICAL HISTORY      Rhinologic Surgery    • MT OPEN TX PHALANGEAL SHAFT FRACTURE PROX/MIDDLE EA Left 8/3/2023    Procedure: CLOSED REDUCTION PERCUTANEOUS PINNING - LEFT RING FINGER;  Surgeon: Suha Fragoso MD;  Location: [facility] MAIN OR;  Service: Orthopedics   • TOTAL THYROIDECTOMY      LAst Assessed: 11/7/2014       Family History   Problem Relation Age of Onset   • Breast cancer Mother 61   • Hypertension Mother    • ALS Mother    • Stroke Mother    • Prostate cancer Father    • Diabetes type II Father      I have reviewed and agree with the history as documented. E-Cigarette/Vaping   • E-Cigarette Use Never User      E-Cigarette/Vaping Substances   • Nicotine No    • THC No    • CBD No    • Flavoring No    • Other No    • Unknown No      Social History     Tobacco Use   • Smoking status: Never   • Smokeless tobacco: Never   Vaping Use   • Vaping Use: Never used   Substance Use Topics   • Alcohol use: Never   • Drug use: Never        Review of Systems   Constitutional: Negative for fever. Cardiovascular: Negative for leg swelling. Gastrointestinal: Negative for abdominal pain. Skin: Positive for color change. All other systems reviewed and are negative.       Physical Exam  ED Triage Vitals   Temperature Pulse Respirations Blood Pressure SpO2   09/12/23 1331 09/12/23 1331 09/12/23 1331 09/12/23 1335 09/12/23 1331   97.6 °F (36.4 °C) 94 18 123/87 95 %      Temp Source Heart Rate Source Patient Position - Orthostatic VS BP Location FiO2 (%)   09/12/23 1331 09/12/23 1331 -- 09/12/23 1331 --   Temporal Monitor  Right arm       Pain Score       --                    Orthostatic Vital Signs  Vitals:    09/12/23 1331 09/12/23 1335   BP:  123/87   Pulse: 94        Physical Exam  Vitals and nursing note reviewed. Constitutional:       General: She is not in acute distress. Appearance: She is not diaphoretic. HENT:      Head: Normocephalic and atraumatic. Mouth/Throat:      Mouth: Mucous membranes are moist.   Eyes:      General: No scleral icterus. Conjunctiva/sclera: Conjunctivae normal.   Cardiovascular:      Rate and Rhythm: Normal rate and regular rhythm. Heart sounds: Normal heart sounds. Pulmonary:      Effort: Pulmonary effort is normal. No respiratory distress. Breath sounds: Normal breath sounds. Abdominal:      General: There is no distension. Palpations: Abdomen is soft. Tenderness: There is abdominal tenderness (Mild) in the left lower quadrant. There is no guarding or rebound. Musculoskeletal:         General: No deformity. Normal range of motion. Cervical back: Normal range of motion and neck supple. Right lower leg: No edema. Left lower leg: No edema. Comments: No evidence of swelling to the face, abdomen, bilateral upper or lower extremities; patient is noted to be obese with adipose tissue in the areas that patient's mother gestures to when expressing concern for "swelling"   Skin:     General: Skin is warm. Capillary Refill: Capillary refill takes less than 2 seconds. Comments: Mild darkening of skin noted to bilateral upper arms and face consistent with changes to sun exposure   Neurological:      Mental Status: She is alert. Mental status is at baseline.    Psychiatric:         Mood and Affect: Mood normal.         Behavior: Behavior normal.         ED Medications  Medications - No data to display    Diagnostic Studies  Results Reviewed     Procedure Component Value Units Date/Time    T4, free [[id number]]  (Normal) Collected: 09/12/23 1527    Lab Status: Final result Specimen: Blood from Arm, Right Updated: 09/12/23 1732     Free T4 0.80 ng/dL     Narrative:        "Therapeutic range for patients medicated with thyroid disorders: 0.61-1.24 ng/dL."    Comprehensive metabolic panel [[id number]]  (Abnormal) Collected: 09/12/23 1527    Lab Status: Final result Specimen: Blood from Arm, Right Updated: 09/12/23 1634     Sodium 136 mmol/L      Potassium 4.5 mmol/L      Chloride 99 mmol/L      CO2 32 mmol/L      ANION GAP 5 mmol/L      BUN 18 mg/dL      Creatinine 1.00 mg/dL      Glucose 80 mg/dL      Calcium 8.8 mg/dL      AST 11 U/L      ALT 6 U/L      Alkaline Phosphatase 35 U/L      Total Protein 6.9 g/dL      Albumin 3.7 g/dL      Total Bilirubin 0.26 mg/dL      eGFR 65 ml/min/1.73sq m     Narrative:      [place] guidelines for Chronic Kidney Disease (CKD):   •  Stage 1 with normal or high GFR (GFR > 90 mL/min/1.73 square meters)  •  Stage 2 Mild CKD (GFR = 60-89 mL/min/1.73 square meters)  •  Stage 3A Moderate CKD (GFR = 45-59 mL/min/1.73 square meters)  •  Stage 3B Moderate CKD (GFR = 30-44 mL/min/1.73 square meters)  •  Stage 4 Severe CKD (GFR = 15-29 mL/min/1.73 square meters)  •  Stage 5 End Stage CKD (GFR <15 mL/min/1.73 square meters)  Note: GFR calculation is accurate only with a steady state creatinine    TSH, 3rd generation with Free T4 reflex [[id number]]  (Abnormal) Collected: 09/12/23 1527    Lab Status: Final result Specimen: Blood from Arm, Right Updated: 09/12/23 1622     TSH 3RD GENERATON 29.779 uIU/mL     CBC and differential [[id number]]  (Abnormal) Collected: 09/12/23 1527    Lab Status: Final result Specimen: Blood from Arm, Right Updated: 09/12/23 1548     WBC 3.83 Thousand/uL      RBC 3.72 Million/uL      Hemoglobin 12.7 g/dL      Hematocrit 37.9 %       fL      MCH 34.1 pg      MCHC 33.5 g/dL      RDW 12.1 %      MPV 10.6 fL      Platelets 828 Thousands/uL      nRBC 0 /100 WBCs      Neutrophils Relative 37 %      Immat GRANS % 1 %      Lymphocytes Relative 49 %      Monocytes Relative 10 %      Eosinophils Relative 2 %      Basophils Relative 1 %      Neutrophils Absolute 1.41 Thousands/µL      Immature Grans Absolute 0.03 Thousand/uL      Lymphocytes Absolute 1.88 Thousands/µL      Monocytes Absolute 0.40 Thousand/µL      Eosinophils Absolute 0.07 Thousand/µL      Basophils Absolute 0.04 Thousands/µL                  XR chest 2 views   Final Result by Santana Valencia MD (09/12 1631)      No acute cardiopulmonary disease. Workstation performed: [id number]               Procedures  Procedures      ED Course  ED Course as of 09/19/23 0310   Tue Sep 12, 2023   1603 CBC and differential(!)  No acute changes noted compared to prior labs                                       Medical Decision Making  Amount and/or Complexity of Data Reviewed  Independent Historian: parent  External Data Reviewed: labs and notes. Details: Patient saw PCP on 8/21/2023 with elevated TSH, increase in levothyroxine at that time. Labs: ordered. Decision-making details documented in ED Course. Radiology: ordered. Risk  OTC drugs. Prescription drug management. ASSESSMENT: Patient is a 48 y.o. female who presents with "swelling" and skin changes. DDX includes but not limited to: Hypothyroidism, weight gain, adrenal insufficiency, hypercortisolism. PLAN: CBC, CMP, TSH with reflex, chest x-ray. Patient care signed out to oncoming resident Dr. Eze Garg pending labs, reevaluation, and final disposition.     Disposition  Final diagnoses:   Swelling     Time reflects when diagnosis was documented in both MDM as applicable and the Disposition within this note     Time User Action Codes Description Comment    9/12/2023  5:30 PM João Vila Add [R60.9] Swelling       ED Disposition     ED Disposition   Discharge    Condition   Stable    Date/Time   Tue Sep 12, 2023  5:30 PM    Comment   Salo Gaviria discharge to home/self care.                Follow-up Information     Follow up With Specialties Details Why Contact Info    [facility] [address], [address]  [address]  [phone]            Discharge Medication List as of 9/12/2023  5:32 PM      CONTINUE these medications which have NOT CHANGED    Details   atorvastatin (LIPITOR) 80 mg tablet Take 1 tablet (80 mg total) by mouth daily with dinner, Starting Mon 2/6/2023, Normal      !! divalproex sodium (DEPAKOTE ER) 250 mg 24 hr tablet Take 3 tablets (750 mg total) by mouth every morning, Starting Wed 9/04/2137, Normal      folic acid (FOLVITE) 1 mg tablet Take 1 tablet (1 mg total) by mouth daily Do not start before February 7, 2023., Starting Tue 2/7/2023, Normal      hydrocortisone 1 % cream Apply to affected area 2 times daily, Normal      ketoconazole (NIZORAL) 2 % shampoo Starting Wed 5/10/2023, Historical Med      !! lacosamide (VIMPAT) 150 mg tablet Take 1 tablet (150 mg total) by mouth every 12 (twelve) hours, Starting Wed 7/26/2023, Normal      !! lacosamide (Vimpat) 50 mg Take 1 tablet (50 mg total) by mouth daily at bedtime, Starting Wed 7/26/2023, Normal      levothyroxine 150 mcg tablet Take 1 tablet (150 mcg total) by mouth daily in the early morning for 21 days, Starting Tue 9/5/2023, Until Tue 9/26/2023, Normal      metoprolol tartrate (LOPRESSOR) 25 mg tablet Take 1 tablet (25 mg total) by mouth every 12 (twelve) hours, Starting Mon 2/6/2023, Normal      niacin (SLO-NIACIN) 500 mg tablet Take 2 tablets (1,000 mg total) by mouth 2 (two) times a day with meals, Starting Mon 2/6/2023, Normal      oxyCODONE (ROXICODONE) 5 immediate release tablet Take 1 tablet (5 mg total) by mouth every 6 (six) hours as needed for severe pain for up to 15 doses Max Daily Amount: 20 mg, Starting Thu 8/3/2023, Normal      paliperidone (INVEGA) 3 mg 24 hr tablet Take 1 tablet (3 mg total) by mouth every morning, Starting Thu 6/8/2023, Until Wed 9/6/2023, Normal      !! divalproex sodium (DEPAKOTE ER) 500 mg 24 hr tablet TAKE 2 TABLETS(1000 MG) BY MOUTH DAILY AT BEDTIME, Normal       !! - Potential duplicate medications found. Please discuss with provider. No discharge procedures on file. PDMP Review       Value Time User    PDMP Reviewed  Yes 8/3/2023 10:50 AM Hebert Polanco PA-C           ED Provider  Attending physically available and evaluated Salo Gaviria. I managed the patient along with the ED Attending.     Electronically Signed by         Candi Burroughs DO  09/19/23 3082

## 2023-12-19 ENCOUNTER — APPOINTMENT (OUTPATIENT)
Dept: INTERNAL MEDICINE | Facility: CLINIC | Age: 53
End: 2023-12-19
Payer: COMMERCIAL

## 2023-12-19 ENCOUNTER — NON-APPOINTMENT (OUTPATIENT)
Age: 53
End: 2023-12-19

## 2023-12-19 VITALS
WEIGHT: 283 LBS | HEIGHT: 72 IN | HEART RATE: 83 BPM | TEMPERATURE: 207.86 F | BODY MASS INDEX: 38.33 KG/M2 | OXYGEN SATURATION: 98 %

## 2023-12-19 DIAGNOSIS — Z00.00 ENCOUNTER FOR GENERAL ADULT MEDICAL EXAMINATION W/OUT ABNORMAL FINDINGS: ICD-10-CM

## 2023-12-19 DIAGNOSIS — Z23 ENCOUNTER FOR IMMUNIZATION: ICD-10-CM

## 2023-12-19 PROCEDURE — 90677 PCV20 VACCINE IM: CPT

## 2023-12-19 PROCEDURE — 93000 ELECTROCARDIOGRAM COMPLETE: CPT

## 2023-12-19 PROCEDURE — G0009: CPT

## 2023-12-19 PROCEDURE — 36415 COLL VENOUS BLD VENIPUNCTURE: CPT

## 2023-12-19 PROCEDURE — 99396 PREV VISIT EST AGE 40-64: CPT | Mod: 25

## 2023-12-19 PROCEDURE — 82270 OCCULT BLOOD FECES: CPT

## 2023-12-19 RX ORDER — DULAGLUTIDE 4.5 MG/.5ML
4.5 INJECTION, SOLUTION SUBCUTANEOUS
Refills: 0 | Status: DISCONTINUED | COMMUNITY
Start: 2022-04-25 | End: 2023-12-19

## 2023-12-19 RX ORDER — ICOSAPENT ETHYL 1000 MG/1
1 CAPSULE ORAL DAILY
Refills: 0 | Status: DISCONTINUED | COMMUNITY
Start: 2022-04-25 | End: 2023-12-19

## 2023-12-19 RX ORDER — INSULIN LISPRO 100 [IU]/ML
100 INJECTION, SOLUTION INTRAVENOUS; SUBCUTANEOUS
Refills: 0 | Status: DISCONTINUED | COMMUNITY
Start: 2017-02-21 | End: 2023-12-19

## 2023-12-19 RX ORDER — INSULIN ASPART 100 [IU]/ML
INJECTION, SOLUTION INTRAVENOUS; SUBCUTANEOUS
Refills: 0 | Status: ACTIVE | COMMUNITY

## 2023-12-19 RX ORDER — INSULIN GLARGINE 100 [IU]/ML
100 INJECTION, SOLUTION SUBCUTANEOUS
Refills: 0 | Status: ACTIVE | COMMUNITY

## 2023-12-19 RX ORDER — MULTIVITAMIN
TABLET ORAL
Refills: 0 | Status: ACTIVE | COMMUNITY

## 2023-12-19 RX ORDER — INSULIN GLARGINE 100 [IU]/ML
100 INJECTION, SOLUTION SUBCUTANEOUS
Refills: 1 | Status: DISCONTINUED | COMMUNITY
Start: 2017-02-21 | End: 2023-12-19

## 2023-12-20 LAB
ALBUMIN SERPL ELPH-MCNC: 4.7 G/DL
ALP BLD-CCNC: 62 U/L
ALT SERPL-CCNC: 38 U/L
ANION GAP SERPL CALC-SCNC: 15 MMOL/L
APPEARANCE: CLEAR
AST SERPL-CCNC: 32 U/L
BACTERIA: NEGATIVE /HPF
BILIRUB SERPL-MCNC: 0.7 MG/DL
BILIRUBIN URINE: NEGATIVE
BLOOD URINE: NEGATIVE
BUN SERPL-MCNC: 12 MG/DL
CALCIUM SERPL-MCNC: 10.3 MG/DL
CAST: 0 /LPF
CHLORIDE SERPL-SCNC: 100 MMOL/L
CHOLEST SERPL-MCNC: 128 MG/DL
CO2 SERPL-SCNC: 25 MMOL/L
COLOR: NORMAL
CREAT SERPL-MCNC: 0.79 MG/DL
EGFR: 106 ML/MIN/1.73M2
EPITHELIAL CELLS: 0 /HPF
GLUCOSE QUALITATIVE U: >=1000 MG/DL
GLUCOSE SERPL-MCNC: 124 MG/DL
HCT VFR BLD CALC: 40.7 %
HDLC SERPL-MCNC: 26 MG/DL
HGB BLD-MCNC: 14.4 G/DL
KETONES URINE: NEGATIVE MG/DL
LDLC SERPL CALC-MCNC: 49 MG/DL
LEUKOCYTE ESTERASE URINE: NEGATIVE
MCHC RBC-ENTMCNC: 33.9 PG
MCHC RBC-ENTMCNC: 35.4 GM/DL
MCV RBC AUTO: 95.8 FL
MICROSCOPIC-UA: NORMAL
NITRITE URINE: NEGATIVE
NONHDLC SERPL-MCNC: 102 MG/DL
PH URINE: 6.5
PLATELET # BLD AUTO: 121 K/UL
POTASSIUM SERPL-SCNC: 4.3 MMOL/L
PROT SERPL-MCNC: 7.6 G/DL
PROTEIN URINE: NORMAL MG/DL
PSA SERPL-MCNC: 0.06 NG/ML
RBC # BLD: 4.25 M/UL
RBC # FLD: 13.6 %
RED BLOOD CELLS URINE: 2 /HPF
SODIUM SERPL-SCNC: 140 MMOL/L
SPECIFIC GRAVITY URINE: 1.03
T4 FREE SERPL-MCNC: 1.2 NG/DL
TRIGL SERPL-MCNC: 347 MG/DL
TSH SERPL-ACNC: 2.61 UIU/ML
URINE CYTOLOGY: NORMAL
UROBILINOGEN URINE: 1 MG/DL
WBC # FLD AUTO: 7.25 K/UL
WHITE BLOOD CELLS URINE: 1 /HPF

## 2023-12-21 NOTE — PHYSICAL EXAM
Detail Level: Zone [General Appearance - Well Developed] : well developed [General Appearance - Well Nourished] : well nourished [General Appearance - In No Acute Distress] : no acute distress [No Oral Pallor] : no oral pallor [Normal Conjunctiva] : the conjunctiva exhibited no abnormalities [Respiration, Rhythm And Depth] : normal respiratory rhythm and effort [Normal Jugular Venous V Waves Present] : normal jugular venous V waves present [Auscultation Breath Sounds / Voice Sounds] : lungs were clear to auscultation bilaterally [Heart Rate And Rhythm] : heart rate and rhythm were normal [Heart Sounds] : normal S1 and S2 [Arterial Pulses Normal] : the arterial pulses were normal [Murmurs] : no murmurs present [Bowel Sounds] : normal bowel sounds [Edema] : no peripheral edema present [Abdomen Soft] : soft [Cyanosis, Localized] : no localized cyanosis [Abnormal Walk] : normal gait [Impaired Insight] : insight and judgment were intact [Oriented To Time, Place, And Person] : oriented to person, place, and time [Skin Turgor] : normal skin turgor [Affect] : the affect was normal

## 2023-12-25 VITALS — SYSTOLIC BLOOD PRESSURE: 105 MMHG | DIASTOLIC BLOOD PRESSURE: 70 MMHG

## 2023-12-25 NOTE — PHYSICAL EXAM
[No Acute Distress] : no acute distress [Well Nourished] : well nourished [Well Developed] : well developed [Well-Appearing] : well-appearing [Normal Sclera/Conjunctiva] : normal sclera/conjunctiva [PERRL] : pupils equal round and reactive to light [EOMI] : extraocular movements intact [Normal Outer Ear/Nose] : the outer ears and nose were normal in appearance [Normal Oropharynx] : the oropharynx was normal [No JVD] : no jugular venous distention [No Lymphadenopathy] : no lymphadenopathy [Supple] : supple [Thyroid Normal, No Nodules] : the thyroid was normal and there were no nodules present [No Respiratory Distress] : no respiratory distress  [No Accessory Muscle Use] : no accessory muscle use [Clear to Auscultation] : lungs were clear to auscultation bilaterally [Normal Rate] : normal rate  [Regular Rhythm] : with a regular rhythm [Normal S1, S2] : normal S1 and S2 [No Murmur] : no murmur heard [No Carotid Bruits] : no carotid bruits [No Abdominal Bruit] : a ~M bruit was not heard ~T in the abdomen [No Varicosities] : no varicosities [Pedal Pulses Present] : the pedal pulses are present [No Edema] : there was no peripheral edema [No Palpable Aorta] : no palpable aorta [No Extremity Clubbing/Cyanosis] : no extremity clubbing/cyanosis [Soft] : abdomen soft [Non Tender] : non-tender [Non-distended] : non-distended [No Masses] : no abdominal mass palpated [No HSM] : no HSM [Normal Bowel Sounds] : normal bowel sounds [Normal Posterior Cervical Nodes] : no posterior cervical lymphadenopathy [Normal Anterior Cervical Nodes] : no anterior cervical lymphadenopathy [No CVA Tenderness] : no CVA  tenderness [No Spinal Tenderness] : no spinal tenderness [No Joint Swelling] : no joint swelling [Grossly Normal Strength/Tone] : grossly normal strength/tone [No Rash] : no rash [Coordination Grossly Intact] : coordination grossly intact [No Focal Deficits] : no focal deficits [Normal Gait] : normal gait [Deep Tendon Reflexes (DTR)] : deep tendon reflexes were 2+ and symmetric [Normal Affect] : the affect was normal [Normal Insight/Judgement] : insight and judgment were intact [No Stool to Guaiac] : no stool to guaiac [Normal Sphincter Tone] : normal sphincter tone [No Mass] : no mass [Stool Occult Blood] : stool negative for occult blood [de-identified] : trace- 1+ edema

## 2023-12-25 NOTE — ASSESSMENT
[FreeTextEntry1] : The patient remains very overweight and he was counseled regarding diet and exercise and weight loss advised.  The BP is fine at 105/70.  Check lipids on Rosuvastatin.  He is seeing a new endocrinologist, Dr. Cooper.  He says he has been off Trulicity for about four months.  He is on Novolog and Basaglar.  He will follow-up there in three weeks.  HgBA1c will be checked there.  No urinary symptoms now.  He has had nephrolithiasis previously.  Check a U/A.  Check a PSA.  Colonoscopy overdue and was urged.    Prevnar 20 today.  He says he has had Shingrix.  S/p new COVID-19 vaccine and flu vaccine.    He sees an ophthalmologist and podiatrist.

## 2023-12-25 NOTE — HEALTH RISK ASSESSMENT
[No falls in past year] : Patient reported no falls in the past year [0] : 2) Feeling down, depressed, or hopeless: Not at all (0) [Fully functional (bathing, dressing, toileting, transferring, walking, feeding)] : Fully functional (bathing, dressing, toileting, transferring, walking, feeding) [Fully functional (using the telephone, shopping, preparing meals, housekeeping, doing laundry, using] : Fully functional and needs no help or supervision to perform IADLs (using the telephone, shopping, preparing meals, housekeeping, doing laundry, using transportation, managing medications and managing finances) [[id number]] : 0 [Reports changes in hearing] : Reports no changes in hearing [Reports changes in vision] : Reports no changes in vision [Reports changes in dental health] : Reports no changes in dental health

## 2023-12-25 NOTE — HISTORY OF PRESENT ILLNESS
[FreeTextEntry1] : The patient is here for a routine visit.  [de-identified] : He feels well.  His diet hasn't been good and he isn't exercising much.  Weight is higher.  No chest pain or dyspnea.  No  symptoms.

## 2024-01-24 ENCOUNTER — APPOINTMENT (OUTPATIENT)
Dept: UROLOGY | Facility: CLINIC | Age: 54
End: 2024-01-24
Payer: COMMERCIAL

## 2024-01-24 VITALS
SYSTOLIC BLOOD PRESSURE: 122 MMHG | DIASTOLIC BLOOD PRESSURE: 82 MMHG | HEIGHT: 72 IN | TEMPERATURE: 207.86 F | WEIGHT: 283 LBS | BODY MASS INDEX: 38.33 KG/M2 | HEART RATE: 96 BPM | OXYGEN SATURATION: 98 %

## 2024-01-24 DIAGNOSIS — N20.0 CALCULUS OF KIDNEY: ICD-10-CM

## 2024-01-24 PROCEDURE — G2211 COMPLEX E/M VISIT ADD ON: CPT

## 2024-01-24 PROCEDURE — 99204 OFFICE O/P NEW MOD 45 MIN: CPT

## 2024-01-24 NOTE — ASSESSMENT
[FreeTextEntry1] : Mr. Peña is a very pleasant 53 year old man here today for kidney stones. He reports having bad right flank pain a few weeks ago as well as suprapubic pain. He went to a UC to see if he had a UTI, and after urine studies they came back negative. He reports that the pain continued to persist until he saw passage of a kidney stone. He reports he did not collect this. Reports a renal ULS was ordered by UC and was performed after he had passed the stone. He had this done at [facility] and was told that it was negative. He denies any current flank pain, hematuria or dysuria. TSH. PTH. CMP. HgbA1c. Uric acid. Litholink. RTO in 4 weeks.  Patient is being seen today for evaluation and management of a chronic and longitudinal ongoing condition and I am of the primary treating physician

## 2024-01-24 NOTE — HISTORY OF PRESENT ILLNESS
[None] : None [FreeTextEntry1] : Mr. Peña is a very pleasant 53 year old man here today for kidney stones. He reports having bad right flank pain a few weeks ago as well as suprapubic pain. He went to a UC to see if he had a UTI, and after urine studies they came back negative. He reports that the pain continued to persist until he saw passage of a kidney stone. He reports he did not collect this. Reports a abdominal ULS was ordered by UC and was performed after he had passed the stone. He had this done at [facility] and was told that it was negative. He denies any current flank pain, hematuria or dysuria.

## 2024-01-27 LAB
ALBUMIN SERPL ELPH-MCNC: 4.8 G/DL
ALP BLD-CCNC: 68 U/L
ALT SERPL-CCNC: 50 U/L
ANION GAP SERPL CALC-SCNC: 14 MMOL/L
AST SERPL-CCNC: 40 U/L
BILIRUB SERPL-MCNC: 0.4 MG/DL
BUN SERPL-MCNC: 14 MG/DL
CALCIUM SERPL-MCNC: 9.9 MG/DL
CALCIUM SERPL-MCNC: 9.9 MG/DL
CHLORIDE SERPL-SCNC: 97 MMOL/L
CO2 SERPL-SCNC: 26 MMOL/L
CREAT SERPL-MCNC: 0.77 MG/DL
EGFR: 107 ML/MIN/1.73M2
ESTIMATED AVERAGE GLUCOSE: 189 MG/DL
GLUCOSE SERPL-MCNC: 308 MG/DL
HBA1C MFR BLD HPLC: 8.2 %
PARATHYROID HORMONE INTACT: 12 PG/ML
POTASSIUM SERPL-SCNC: 5.1 MMOL/L
PROT SERPL-MCNC: 7.8 G/DL
SODIUM SERPL-SCNC: 136 MMOL/L
TSH SERPL-ACNC: 1.96 UIU/ML
URATE SERPL-MCNC: 5.8 MG/DL

## 2024-01-29 ENCOUNTER — NON-APPOINTMENT (OUTPATIENT)
Age: 54
End: 2024-01-29

## 2024-01-29 ENCOUNTER — RX RENEWAL (OUTPATIENT)
Age: 54
End: 2024-01-29

## 2024-01-29 ENCOUNTER — APPOINTMENT (OUTPATIENT)
Dept: CARDIOLOGY | Facility: CLINIC | Age: 54
End: 2024-01-29
Payer: COMMERCIAL

## 2024-01-29 VITALS
OXYGEN SATURATION: 98 % | HEART RATE: 85 BPM | BODY MASS INDEX: 38.33 KG/M2 | DIASTOLIC BLOOD PRESSURE: 80 MMHG | WEIGHT: 283 LBS | SYSTOLIC BLOOD PRESSURE: 98 MMHG | HEIGHT: 72 IN

## 2024-01-29 DIAGNOSIS — E78.1 PURE HYPERGLYCERIDEMIA: ICD-10-CM

## 2024-01-29 DIAGNOSIS — E66.9 OBESITY, UNSPECIFIED: ICD-10-CM

## 2024-01-29 DIAGNOSIS — E10.9 TYPE 1 DIABETES MELLITUS W/OUT COMPLICATIONS: ICD-10-CM

## 2024-01-29 PROCEDURE — 93000 ELECTROCARDIOGRAM COMPLETE: CPT

## 2024-01-29 PROCEDURE — G2211 COMPLEX E/M VISIT ADD ON: CPT

## 2024-01-29 PROCEDURE — 99214 OFFICE O/P EST MOD 30 MIN: CPT | Mod: 25

## 2024-01-29 RX ORDER — VALSARTAN 80 MG/1
80 TABLET, COATED ORAL
Qty: 90 | Refills: 3 | Status: ACTIVE | COMMUNITY
Start: 2019-05-16 | End: 1900-01-01

## 2024-01-29 NOTE — HISTORY OF PRESENT ILLNESS
[FreeTextEntry1] : Trulicity stopped due to eye issues. Taking vascepa as directed. LDL Most recently was 49.  His triglycerides were over 300.  His hemoglobin A1c has been over 8. Wife: snores on rare occasions. Wakes up refreshed. No AM headaches.  Less exercise than he wants.  Prior: He has been feeling well.  Diagnosed with type 1 DM. Doing well on insulin. Last A1c was 6.4  LDL was 61.  Stationary bike in the office. No chest pain or dyspnea. Recent hiking noted some dyspnea. No chest pain. Renal stones have been an issue.

## 2024-01-29 NOTE — DISCUSSION/SUMMARY
[EKG obtained to assist in diagnosis and management of assessed problem(s)] : EKG obtained to assist in diagnosis and management of assessed problem(s) [FreeTextEntry1] : He is a 53-year-old with Insulin requiring diabetes mellitus, hyperlipidemia/triglyceridemia with some HENDRICKSON.  His ECG There is normal sinus rhythm and is unchanged from prior. HTN: BP is optimal. Continue valsartan.  LDL is at goal (49). Continue Crestor 20. Continue to take Vascepa.  His elevated triglyceride levels are likely related to his poorly controlled blood sugars. This should improve with better glycemic control. Diabetes mellitus: Of optimal control.

## 2024-03-24 ENCOUNTER — RX RENEWAL (OUTPATIENT)
Age: 54
End: 2024-03-24

## 2024-03-24 RX ORDER — ROSUVASTATIN CALCIUM 20 MG/1
20 TABLET, FILM COATED ORAL DAILY
Qty: 90 | Refills: 3 | Status: ACTIVE | COMMUNITY
Start: 2019-12-02 | End: 1900-01-01

## 2024-05-07 ENCOUNTER — APPOINTMENT (OUTPATIENT)
Dept: UROLOGY | Facility: CLINIC | Age: 54
End: 2024-05-07

## 2024-07-29 ENCOUNTER — APPOINTMENT (OUTPATIENT)
Dept: CARDIOLOGY | Facility: CLINIC | Age: 54
End: 2024-07-29

## 2024-07-29 NOTE — H&P ADULT - NSHPLABSRESULTS_GEN_ALL_CORE
Lab Results:  CBC  CBC Full  -  ( 12 Mar 2019 22:46 )  WBC Count : 8.6 K/uL  Hemoglobin : 15.0 g/dL  Hematocrit : 41.6 %  Platelet Count - Automated : 110 K/uL  Mean Cell Volume : 94.1 fl  Mean Cell Hemoglobin : 34.0 pg  Mean Cell Hemoglobin Concentration : 36.1 gm/dL  Auto Neutrophil # : 6.0 K/uL  Auto Lymphocyte # : 1.8 K/uL  Auto Monocyte # : 0.5 K/uL  Auto Eosinophil # : 0.3 K/uL  Auto Basophil # : 0.0 K/uL  Auto Neutrophil % : 69.4 %  Auto Lymphocyte % : 20.9 %  Auto Monocyte % : 5.8 %  Auto Eosinophil % : 3.5 %  Auto Basophil % : 0.3 %    .		Differential:	[] Automated		[] Manual  Chemistry      137  |  100  |  16  ----------------------------<  158<H>  4.5   |  21<L>  |  1.05    Ca    9.7      12 Mar 2019 22:46    TPro  8.1  /  Alb  4.4  /  TBili  1.0  /  DBili  x   /  AST  57<H>  /  ALT  30  /  AlkPhos  56      LIVER FUNCTIONS - ( 12 Mar 2019 22:46 )  Alb: 4.4 g/dL / Pro: 8.1 g/dL / ALK PHOS: 56 U/L / ALT: 30 U/L / AST: 57 U/L / GGT: x           PTT - ( 13 Mar 2019 00:49 )  PTT:28.1 sec  Urinalysis Basic - ( 13 Mar 2019 00:49 )    Color: Yellow / Appearance: Clear / S.029 / pH: x  Gluc: x / Ketone: Small  / Bili: Negative / Urobili: 3 mg/dL   Blood: x / Protein: Trace / Nitrite: Negative   Leuk Esterase: Negative / RBC: 3 /hpf / WBC 1 /HPF   Sq Epi: x / Non Sq Epi: 1 /hpf / Bacteria: Negative        MICROBIOLOGY/CULTURES:    RADIOLOGY RESULTS:    < from: CT Abdomen and Pelvis No Cont (19 @ 22:52) >    KIDNEYS/URETERS: 10 mm calculus within the right renal pelvis. Mild   distention of a upper pole calyx.. Nonobstructing left upper pole renal   calculus. No left-sided hydronephrosis. Mild bilateral relatively   symmetric perinephric stranding, nonspecific.    BLADDER: Within normal limits.    < end of copied text >
29-Jul-2024 07:58

## 2025-01-27 ENCOUNTER — RX RENEWAL (OUTPATIENT)
Age: 55
End: 2025-01-27

## 2025-03-04 NOTE — H&P PST ADULT - SYMPTOMS
FAMILY HISTORY:  Father  Still living? No  Family history of liver disease, Age at diagnosis: Age Unknown    Mother  Still living? No  Family history of colon cancer in mother, Age at diagnosis: Age Unknown    Sibling  Still living? No  Family history of cancer, Age at diagnosis: Age Unknown    
none

## 2025-04-09 NOTE — ED ADULT TRIAGE NOTE - AS HEIGHT TYPE
"  Outpatient Physical Therapy  DAILY TREATMENT     [facility] [address]  [address]  [place] [address]  Phone:  [phone]  Fax:  [phone]    Date: 04/09/2025    Patient: Krish Kasper  YOB: 1973  MRN: [id number]     Time Calculation    Start time: 1110  Stop time: 1210 Time Calculation (min): 60 minutes         Chief Complaint: Back Problem    Visit #: 3    SUBJECTIVE:  Pt reports feeling muscle soreness after traction, but overall better. Has also been feeling better in the last week, but hasn't been as active. Has decreased use of pain medication. Has more pain today in the low back and L glute after walking ~5 miles yesterday. 3/10 pain    OBJECTIVE:  Current objective measures:           Therapeutic Exercises (CPT 84111):     1. Supine sciatic nerve glide, x10 ea    2. Prone hip extension, 10x5\" hold, peripheralizes, reduced w/ ARNIE    3. ARNIE, 2x2'    4. Prone press up w/ self-OP, x10    5. Bird dog, 10x5\" hold    7. Seated sciatic nerve glide, x5 ea    8. Standing lumbar ext, 2x10    9. Suitcase carry 20#, 2x40 ft    10. Pallof press w/ double blue, x10 ea      Therapeutic Exercise Summary: Access Code: [email or web address]  URL: [email or web address]  Date: 04/09/2025  Prepared by: Sarah Agustin    Exercises  - Standing Lumbar Extension  - 3 x daily - 7 x weekly - 10 reps  - Supine 90/90 Sciatic Nerve Glide with Knee Flexion/Extension  - 3 x daily - 7 x weekly - 10 reps  - Seated Slump Nerve Glide   - Prone Alternating Arm and Leg Lifts  - 1 x daily - 7 x weekly - 10 reps - 5 second hold  - Bird Dog  - 1 x daily - 7 x weekly - 10 reps - 5 second hold    Therapeutic Treatments and Modalities:     1. Manual Therapy (CPT 76685), L3-5 CPA, L UPA gd 2-3 in prone and prone extension, reports centralization    2. Mechanical Traction (CPT 93793), Lumbar traction 90/70# intermittent 60/20\" w/ MHP, x15', supine    Time-based treatments/modalities:    Physical " Therapy Timed Treatment Charges  Therapeutic exercise minutes (CPT 57778): 40 minutes        ASSESSMENT:   Response to treatment: Pt demo improved activity tolerance, continues to demo extension bias. Was able to progress core strength and stability exercises. Pt reported some centralized pain, but with low irritability. Pt is progressing at this time and is appropriate to cont PT.    PLAN/RECOMMENDATIONS:   Plan for treatment: therapy treatment to continue next visit.  Planned interventions for next visit: continue with current treatment.          stated